# Patient Record
Sex: MALE | Race: BLACK OR AFRICAN AMERICAN | NOT HISPANIC OR LATINO | URBAN - METROPOLITAN AREA
[De-identification: names, ages, dates, MRNs, and addresses within clinical notes are randomized per-mention and may not be internally consistent; named-entity substitution may affect disease eponyms.]

---

## 2017-01-31 ENCOUNTER — TELEPHONE (OUTPATIENT)
Dept: INTERNAL MEDICINE | Facility: CLINIC | Age: 37
End: 2017-01-31

## 2017-01-31 RX ORDER — LEVOTHYROXINE SODIUM 300 UG/1
300 TABLET ORAL DAILY
Qty: 30 TABLET | Refills: 5 | Status: SHIPPED | OUTPATIENT
Start: 2017-01-31 | End: 2017-05-10 | Stop reason: SDUPTHER

## 2017-01-31 NOTE — TELEPHONE ENCOUNTER
rx sent to pharmacy. Spoke with Pt and informed him of this. Pt verbalized understanding and much appr'c

## 2017-01-31 NOTE — TELEPHONE ENCOUNTER
----- Message from Ashli Irwin sent at 1/31/2017  9:47 AM EST -----  Contact: DEMI ARCHER PH:[phone]  DEMI JOHNSON CALLING FOR A REFILL FOR LEVOTHYROXINE. HE USES THE "[facility] [facility]. HE CAN BE REACHED [phone]

## 2017-05-10 ENCOUNTER — OFFICE VISIT (OUTPATIENT)
Dept: INTERNAL MEDICINE | Facility: CLINIC | Age: 37
End: 2017-05-10

## 2017-05-10 VITALS
WEIGHT: 315 LBS | DIASTOLIC BLOOD PRESSURE: 76 MMHG | RESPIRATION RATE: 24 BRPM | BODY MASS INDEX: 37.19 KG/M2 | SYSTOLIC BLOOD PRESSURE: 110 MMHG | HEART RATE: 56 BPM | HEIGHT: 77 IN

## 2017-05-10 DIAGNOSIS — M54.2 NECK PAIN ON LEFT SIDE: ICD-10-CM

## 2017-05-10 DIAGNOSIS — Z00.129 ENCOUNTER FOR ROUTINE CHILD HEALTH EXAMINATION WITHOUT ABNORMAL FINDINGS: Primary | ICD-10-CM

## 2017-05-10 DIAGNOSIS — Z13.220 LIPID SCREENING: ICD-10-CM

## 2017-05-10 DIAGNOSIS — E03.9 HYPOTHYROIDISM, UNSPECIFIED TYPE: ICD-10-CM

## 2017-05-10 LAB
ALBUMIN SERPL-MCNC: 4.1 G/DL (ref 3.2–4.8)
ALBUMIN/GLOB SERPL: 1.6 G/DL (ref 1.5–2.5)
ALP SERPL-CCNC: 60 U/L (ref 25–100)
ALT SERPL W P-5'-P-CCNC: 18 U/L (ref 7–40)
ANION GAP SERPL CALCULATED.3IONS-SCNC: 7 MMOL/L (ref 3–11)
ARTICHOKE IGE QN: 72 MG/DL (ref 0–130)
AST SERPL-CCNC: 28 U/L (ref 0–33)
BILIRUB SERPL-MCNC: 0.8 MG/DL (ref 0.3–1.2)
BUN BLD-MCNC: 16 MG/DL (ref 9–23)
BUN/CREAT SERPL: 16 (ref 7–25)
CALCIUM SPEC-SCNC: 9.5 MG/DL (ref 8.7–10.4)
CHLORIDE SERPL-SCNC: 103 MMOL/L (ref 99–109)
CHOLEST SERPL-MCNC: 139 MG/DL (ref 0–200)
CO2 SERPL-SCNC: 29 MMOL/L (ref 20–31)
CREAT BLD-MCNC: 1 MG/DL (ref 0.6–1.3)
DEPRECATED RDW RBC AUTO: 44.6 FL (ref 37–54)
ERYTHROCYTE [DISTWIDTH] IN BLOOD BY AUTOMATED COUNT: 13.4 % (ref 11.3–14.5)
GFR SERPL CREATININE-BSD FRML MDRD: 102 ML/MIN/1.73
GLOBULIN UR ELPH-MCNC: 2.6 GM/DL
GLUCOSE BLD-MCNC: 97 MG/DL (ref 70–100)
HCT VFR BLD AUTO: 44 % (ref 38.9–50.9)
HDLC SERPL-MCNC: 46 MG/DL (ref 40–60)
HGB BLD-MCNC: 14.5 G/DL (ref 13.1–17.5)
MCH RBC QN AUTO: 29.8 PG (ref 27–31)
MCHC RBC AUTO-ENTMCNC: 33 G/DL (ref 32–36)
MCV RBC AUTO: 90.5 FL (ref 80–99)
PLATELET # BLD AUTO: 235 10*3/MM3 (ref 150–450)
PMV BLD AUTO: 9.7 FL (ref 6–12)
POTASSIUM BLD-SCNC: 4.2 MMOL/L (ref 3.5–5.5)
PROT SERPL-MCNC: 6.7 G/DL (ref 5.7–8.2)
RBC # BLD AUTO: 4.86 10*6/MM3 (ref 4.2–5.76)
SODIUM BLD-SCNC: 139 MMOL/L (ref 132–146)
T4 FREE SERPL-MCNC: 1.47 NG/DL (ref 0.89–1.76)
TRIGL SERPL-MCNC: 69 MG/DL (ref 0–150)
TSH SERPL DL<=0.05 MIU/L-ACNC: 4.54 MIU/ML (ref 0.35–5.35)
WBC NRBC COR # BLD: 4.36 10*3/MM3 (ref 3.5–10.8)

## 2017-05-10 PROCEDURE — 85027 COMPLETE CBC AUTOMATED: CPT | Performed by: INTERNAL MEDICINE

## 2017-05-10 PROCEDURE — 99385 PREV VISIT NEW AGE 18-39: CPT | Performed by: INTERNAL MEDICINE

## 2017-05-10 PROCEDURE — 90715 TDAP VACCINE 7 YRS/> IM: CPT | Performed by: INTERNAL MEDICINE

## 2017-05-10 PROCEDURE — 90471 IMMUNIZATION ADMIN: CPT | Performed by: INTERNAL MEDICINE

## 2017-05-10 PROCEDURE — 80053 COMPREHEN METABOLIC PANEL: CPT | Performed by: INTERNAL MEDICINE

## 2017-05-10 PROCEDURE — 80061 LIPID PANEL: CPT | Performed by: INTERNAL MEDICINE

## 2017-05-10 PROCEDURE — 84443 ASSAY THYROID STIM HORMONE: CPT | Performed by: INTERNAL MEDICINE

## 2017-05-10 PROCEDURE — 84439 ASSAY OF FREE THYROXINE: CPT | Performed by: INTERNAL MEDICINE

## 2017-05-10 PROCEDURE — 36415 COLL VENOUS BLD VENIPUNCTURE: CPT | Performed by: INTERNAL MEDICINE

## 2017-05-10 RX ORDER — LEVOTHYROXINE SODIUM 300 UG/1
300 TABLET ORAL DAILY
Qty: 90 TABLET | Refills: 2 | Status: SHIPPED | OUTPATIENT
Start: 2017-05-10 | End: 2018-08-04 | Stop reason: SDUPTHER

## 2017-06-28 ENCOUNTER — TELEPHONE (OUTPATIENT)
Dept: INTERNAL MEDICINE | Facility: CLINIC | Age: 37
End: 2017-06-28

## 2017-06-28 NOTE — TELEPHONE ENCOUNTER
----- Message from Rosa Vincent sent at 6/28/2017 11:06 AM EDT -----  [facility] in [place] wants to know if Dr. Rodriguez will prescribe saliva max oral rinse for dry mouth.  Call them at [phone]  .

## 2018-05-01 PROBLEM — M54.31 SCIATICA OF RIGHT SIDE: Status: ACTIVE | Noted: 2018-05-01

## 2018-05-02 ENCOUNTER — TELEPHONE (OUTPATIENT)
Dept: INTERNAL MEDICINE | Facility: CLINIC | Age: 38
End: 2018-05-02

## 2018-05-02 NOTE — TELEPHONE ENCOUNTER
In order for patient to have an MRI considered for ordering for insurance purposes patient will have to be seen and evaluated.

## 2018-05-02 NOTE — TELEPHONE ENCOUNTER
----- Message from Adrianna Corbin sent at 5/2/2018  8:02 AM EDT -----  PATIENT WENT TO URGENT CARE LAST NIGHT DUE TO BACK PAIN & HIP AREA. THEY GAVE THE PATIENT 3 SHOTS AND ARE REQUESTING HE GET AN MRI BUT IT WOULD NEED TO BE ORDERED.     PLEASE CALL PATIENT AT : [phone]    THANK YOU

## 2018-08-06 RX ORDER — LEVOTHYROXINE SODIUM 300 UG/1
TABLET ORAL
Qty: 30 TABLET | Refills: 0 | Status: SHIPPED | OUTPATIENT
Start: 2018-08-06 | End: 2019-02-16 | Stop reason: SDUPTHER

## 2019-01-09 RX ORDER — LEVOTHYROXINE SODIUM 300 UG/1
TABLET ORAL
Qty: 30 TABLET | Refills: 0 | OUTPATIENT
Start: 2019-01-09

## 2019-02-18 RX ORDER — LEVOTHYROXINE SODIUM 300 UG/1
TABLET ORAL
Qty: 30 TABLET | Refills: 0 | Status: SHIPPED | OUTPATIENT
Start: 2019-02-18 | End: 2019-04-12 | Stop reason: SDUPTHER

## 2019-03-18 RX ORDER — LEVOTHYROXINE SODIUM 300 UG/1
TABLET ORAL
Qty: 30 TABLET | Refills: 0 | OUTPATIENT
Start: 2019-03-18

## 2019-04-09 RX ORDER — LEVOTHYROXINE SODIUM 300 UG/1
TABLET ORAL
Qty: 30 TABLET | Refills: 0 | OUTPATIENT
Start: 2019-04-09

## 2019-04-12 ENCOUNTER — OFFICE VISIT (OUTPATIENT)
Dept: INTERNAL MEDICINE | Facility: CLINIC | Age: 39
End: 2019-04-12

## 2019-04-12 VITALS
HEART RATE: 56 BPM | HEIGHT: 77 IN | SYSTOLIC BLOOD PRESSURE: 116 MMHG | TEMPERATURE: 98 F | DIASTOLIC BLOOD PRESSURE: 76 MMHG | WEIGHT: 300 LBS | OXYGEN SATURATION: 96 % | BODY MASS INDEX: 35.42 KG/M2 | RESPIRATION RATE: 16 BRPM

## 2019-04-12 DIAGNOSIS — E03.9 ADULT HYPOTHYROIDISM: Primary | ICD-10-CM

## 2019-04-12 LAB
T4 FREE SERPL-MCNC: 0.61 NG/DL (ref 0.93–1.7)
TSH SERPL DL<=0.05 MIU/L-ACNC: 36.4 MIU/ML (ref 0.27–4.2)

## 2019-04-12 PROCEDURE — 99213 OFFICE O/P EST LOW 20 MIN: CPT | Performed by: PHYSICIAN ASSISTANT

## 2019-04-12 PROCEDURE — 36415 COLL VENOUS BLD VENIPUNCTURE: CPT | Performed by: PHYSICIAN ASSISTANT

## 2019-04-12 PROCEDURE — 84439 ASSAY OF FREE THYROXINE: CPT | Performed by: PHYSICIAN ASSISTANT

## 2019-04-12 PROCEDURE — 84443 ASSAY THYROID STIM HORMONE: CPT | Performed by: PHYSICIAN ASSISTANT

## 2019-04-12 RX ORDER — LEVOTHYROXINE SODIUM 300 UG/1
300 TABLET ORAL DAILY
Qty: 30 TABLET | Refills: 5 | Status: SHIPPED | OUTPATIENT
Start: 2019-04-12 | End: 2019-05-31

## 2019-04-12 NOTE — PROGRESS NOTES
Chief Complaint   Patient presents with   • Hypothyroidism     1 year F/U, refill medication       Subjective       History of Present Illness     Guido Tang is a 38 y.o. male. He presents for follow up of hypothyroidism. Pt states he is doing well with no significant new concerns. He has been out of his synthroid for about 10 days, prior to this was taking consistently every morning. He has noted a little more fatigue this week since being without his synthroid. Otherwise no problems. Denies HA, vision change, heart palpitations, CP, SOA, heat or cold intolerance, or any other concerns.         The following portions of the patient's history were reviewed and updated as appropriate: allergies, current medications, past medical history, past social history, past surgical history and problem list.    Allergies   Allergen Reactions   • Azithromycin Nausea Only and Nausea And Vomiting     Social History     Tobacco Use   • Smoking status: Never Smoker   Substance Use Topics   • Alcohol use: Yes     Comment: Social         Current Outpatient Medications:   •  levothyroxine (SYNTHROID, LEVOTHROID) 300 MCG tablet, Take 1 tablet by mouth Daily., Disp: 30 tablet, Rfl: 5  •  DUEXIS 800-26.6 MG tablet, , Disp: , Rfl:     Review of Systems   Constitutional: Positive for fatigue (mild low energy). Negative for chills and fever.   HENT: Negative for congestion and sore throat.    Eyes: Negative for blurred vision.   Respiratory: Negative for cough and shortness of breath.    Cardiovascular: Negative for chest pain and palpitations.   Gastrointestinal: Negative for abdominal pain, diarrhea, nausea and vomiting.   Endocrine: Negative for cold intolerance and heat intolerance.   Genitourinary: Negative for dysuria and hematuria.   Neurological: Negative for dizziness and headache.       Objective   Vitals:    04/12/19 1128   BP: 116/76   Pulse: 56   Resp: 16   Temp: 98 °F (36.7 °C)   SpO2: 96%     Physical Exam    Constitutional: He appears well-developed and well-nourished.   HENT:   Head: Normocephalic and atraumatic.   Right Ear: Tympanic membrane, external ear and ear canal normal.   Left Ear: Tympanic membrane, external ear and ear canal normal.   Mouth/Throat: Oropharynx is clear and moist and mucous membranes are normal.   Eyes: Conjunctivae are normal.   Neck: Normal range of motion. Neck supple. No thyromegaly present.   Cardiovascular: Normal rate and regular rhythm.   No murmur heard.  Pulmonary/Chest: Effort normal and breath sounds normal. He has no wheezes. He has no rales.   Lymphadenopathy:     He has no cervical adenopathy.   Psychiatric: He has a normal mood and affect. His behavior is normal.             Assessment/Plan   Guido was seen today for hypothyroidism.    Diagnoses and all orders for this visit:    Adult hypothyroidism  -     TSH  -     T4, Free    Other orders  -     levothyroxine (SYNTHROID, LEVOTHROID) 300 MCG tablet; Take 1 tablet by mouth Daily.      Resume synthroid as directed.          Return in about 6 months (around 10/12/2019) for Annual physical- fasting labs.

## 2019-04-12 NOTE — TELEPHONE ENCOUNTER
DEMI CHECKING STATUS OF REFILL REQUEST. HE HAS BEEN OUT FOR A FEW DAYS AND WOULD LIKE TO GET THIS TODAY IF POSSIBLE. HE CAN BE REACHED -[phone]

## 2019-04-15 ENCOUNTER — TELEPHONE (OUTPATIENT)
Dept: INTERNAL MEDICINE | Facility: CLINIC | Age: 39
End: 2019-04-15

## 2019-04-15 DIAGNOSIS — E03.9 HYPOTHYROIDISM, UNSPECIFIED TYPE: Primary | ICD-10-CM

## 2019-04-15 NOTE — TELEPHONE ENCOUNTER
----- Message from Mary Benson PA-C sent at 4/15/2019  1:19 PM EDT -----  Please inform pt that his thyroid levels are low, as expected as he has not been on synthroid x10 days. He needs to come back in office for labs only at his convenience in about 4 weeks (after resuming med), no sooner than 5/12/2019. Orders entered.

## 2019-05-29 ENCOUNTER — LAB (OUTPATIENT)
Dept: INTERNAL MEDICINE | Facility: CLINIC | Age: 39
End: 2019-05-29

## 2019-05-29 DIAGNOSIS — E03.9 HYPOTHYROIDISM, UNSPECIFIED TYPE: ICD-10-CM

## 2019-05-29 LAB
T4 FREE SERPL-MCNC: 1.74 NG/DL (ref 0.93–1.7)
TSH SERPL DL<=0.05 MIU/L-ACNC: 0.18 MIU/ML (ref 0.27–4.2)

## 2019-05-29 PROCEDURE — 84443 ASSAY THYROID STIM HORMONE: CPT | Performed by: PHYSICIAN ASSISTANT

## 2019-05-29 PROCEDURE — 36415 COLL VENOUS BLD VENIPUNCTURE: CPT | Performed by: PHYSICIAN ASSISTANT

## 2019-05-29 PROCEDURE — 84439 ASSAY OF FREE THYROXINE: CPT | Performed by: PHYSICIAN ASSISTANT

## 2019-05-31 DIAGNOSIS — E03.9 HYPOTHYROIDISM, UNSPECIFIED TYPE: Primary | ICD-10-CM

## 2019-05-31 RX ORDER — LEVOTHYROXINE SODIUM 0.2 MG/1
200 TABLET ORAL DAILY
Qty: 30 TABLET | Refills: 2 | Status: SHIPPED | OUTPATIENT
Start: 2019-05-31 | End: 2019-11-28 | Stop reason: SDUPTHER

## 2019-10-14 ENCOUNTER — OFFICE VISIT (OUTPATIENT)
Dept: INTERNAL MEDICINE | Facility: CLINIC | Age: 39
End: 2019-10-14

## 2019-10-14 VITALS
HEART RATE: 52 BPM | RESPIRATION RATE: 20 BRPM | HEIGHT: 77 IN | BODY MASS INDEX: 35 KG/M2 | SYSTOLIC BLOOD PRESSURE: 124 MMHG | WEIGHT: 296.38 LBS | DIASTOLIC BLOOD PRESSURE: 80 MMHG | TEMPERATURE: 97.3 F | OXYGEN SATURATION: 99 %

## 2019-10-14 DIAGNOSIS — E03.9 HYPOTHYROIDISM, UNSPECIFIED TYPE: ICD-10-CM

## 2019-10-14 DIAGNOSIS — D22.9 NEVUS: ICD-10-CM

## 2019-10-14 DIAGNOSIS — Z12.5 SCREENING PSA (PROSTATE SPECIFIC ANTIGEN): ICD-10-CM

## 2019-10-14 DIAGNOSIS — Z00.00 WELL ADULT EXAM: ICD-10-CM

## 2019-10-14 DIAGNOSIS — R07.89 SENSATION OF CHEST PRESSURE: Primary | ICD-10-CM

## 2019-10-14 LAB
ALBUMIN SERPL-MCNC: 4.4 G/DL (ref 3.5–5.2)
ALBUMIN/GLOB SERPL: 1.5 G/DL
ALP SERPL-CCNC: 53 U/L (ref 39–117)
ALT SERPL W P-5'-P-CCNC: 10 U/L (ref 1–41)
ANION GAP SERPL CALCULATED.3IONS-SCNC: 9.7 MMOL/L (ref 5–15)
AST SERPL-CCNC: 18 U/L (ref 1–40)
BILIRUB SERPL-MCNC: 0.7 MG/DL (ref 0.2–1.2)
BUN BLD-MCNC: 17 MG/DL (ref 6–20)
BUN/CREAT SERPL: 12.5 (ref 7–25)
CALCIUM SPEC-SCNC: 9.1 MG/DL (ref 8.6–10.5)
CHLORIDE SERPL-SCNC: 102 MMOL/L (ref 98–107)
CHOLEST SERPL-MCNC: 142 MG/DL (ref 0–200)
CO2 SERPL-SCNC: 29.3 MMOL/L (ref 22–29)
CREAT BLD-MCNC: 1.36 MG/DL (ref 0.76–1.27)
DEPRECATED RDW RBC AUTO: 43.3 FL (ref 37–54)
ERYTHROCYTE [DISTWIDTH] IN BLOOD BY AUTOMATED COUNT: 12.8 % (ref 12.3–15.4)
GFR SERPL CREATININE-BSD FRML MDRD: 71 ML/MIN/1.73
GLOBULIN UR ELPH-MCNC: 2.9 GM/DL
GLUCOSE BLD-MCNC: 87 MG/DL (ref 65–99)
HCT VFR BLD AUTO: 46.4 % (ref 37.5–51)
HDLC SERPL-MCNC: 52 MG/DL (ref 40–60)
HGB BLD-MCNC: 15.6 G/DL (ref 13–17.7)
LDLC SERPL CALC-MCNC: 77 MG/DL (ref 0–100)
LDLC/HDLC SERPL: 1.48 {RATIO}
MCH RBC QN AUTO: 30.7 PG (ref 26.6–33)
MCHC RBC AUTO-ENTMCNC: 33.6 G/DL (ref 31.5–35.7)
MCV RBC AUTO: 91.3 FL (ref 79–97)
PLATELET # BLD AUTO: 253 10*3/MM3 (ref 140–450)
PMV BLD AUTO: 9.4 FL (ref 6–12)
POTASSIUM BLD-SCNC: 4.5 MMOL/L (ref 3.5–5.2)
PROT SERPL-MCNC: 7.3 G/DL (ref 6–8.5)
PSA SERPL-MCNC: 0.68 NG/ML (ref 0–4)
RBC # BLD AUTO: 5.08 10*6/MM3 (ref 4.14–5.8)
SODIUM BLD-SCNC: 141 MMOL/L (ref 136–145)
T4 FREE SERPL-MCNC: 1.77 NG/DL (ref 0.93–1.7)
TRIGL SERPL-MCNC: 64 MG/DL (ref 0–150)
TSH SERPL DL<=0.05 MIU/L-ACNC: 5.34 UIU/ML (ref 0.27–4.2)
VLDLC SERPL-MCNC: 12.8 MG/DL (ref 5–40)
WBC NRBC COR # BLD: 3.66 10*3/MM3 (ref 3.4–10.8)

## 2019-10-14 PROCEDURE — 84443 ASSAY THYROID STIM HORMONE: CPT | Performed by: INTERNAL MEDICINE

## 2019-10-14 PROCEDURE — 80053 COMPREHEN METABOLIC PANEL: CPT | Performed by: INTERNAL MEDICINE

## 2019-10-14 PROCEDURE — 84439 ASSAY OF FREE THYROXINE: CPT | Performed by: INTERNAL MEDICINE

## 2019-10-14 PROCEDURE — 80061 LIPID PANEL: CPT | Performed by: INTERNAL MEDICINE

## 2019-10-14 PROCEDURE — 85027 COMPLETE CBC AUTOMATED: CPT | Performed by: INTERNAL MEDICINE

## 2019-10-14 PROCEDURE — 99395 PREV VISIT EST AGE 18-39: CPT | Performed by: INTERNAL MEDICINE

## 2019-10-14 PROCEDURE — G0103 PSA SCREENING: HCPCS | Performed by: INTERNAL MEDICINE

## 2019-10-14 PROCEDURE — 36415 COLL VENOUS BLD VENIPUNCTURE: CPT | Performed by: INTERNAL MEDICINE

## 2019-10-14 PROCEDURE — 93000 ELECTROCARDIOGRAM COMPLETE: CPT | Performed by: INTERNAL MEDICINE

## 2019-10-14 NOTE — PROGRESS NOTES
Subjective   Guido Tang is a 38 y.o. male.     History of Present Illness     The following portions of the patient's history were reviewed and updated as appropriate: allergies, current medications, past family history, past medical history, past social history, past surgical history and problem list.        Complete Adult Physical    1 hypothyroid- chronic and doing well.  Continue on the levothyroxine.    2 chest pressure-patient says that he has been having intermittent episodes of chest pressure over the past 1 year.  These sensations usually occur when he is under a lot of stress.  Patient says that these sensations only lasts a few seconds to minutes and then they subside.  He does other physical activity and the symptoms never occur but usually it occurs during high stress.  Patient denies any fever, chills, nausea, vomiting, headache, dyspnea on exertion, palpitations, dizziness, syncope, or any other systemic signs.    Diet regular        Exercise: Occasional        Social History: No EtOH consumption, no IV drug use, no marijuana, illicit drugs.        Preventative Screenings  Discussed PSA screening        Immunizations: Patient declined influenza vaccine.            Review of Systems   Constitutional: Negative.    HENT: Negative.    Eyes: Negative.    Respiratory: Negative.    Cardiovascular: Negative.    Gastrointestinal: Negative.    Endocrine: Negative.    Genitourinary: Negative.    Musculoskeletal: Negative.    All other systems reviewed and are negative.      Objective   Physical Exam   Constitutional: He is oriented to person, place, and time. He appears well-developed and well-nourished.   HENT:   Head: Normocephalic.   Right Ear: External ear normal.   Left Ear: External ear normal.   Nose: Nose normal.   Mouth/Throat: Oropharynx is clear and moist.   Eyes: Conjunctivae and EOM are normal. Pupils are equal, round, and reactive to light.   Neck: Normal range of motion. Neck supple.    Cardiovascular: Normal rate, regular rhythm, normal heart sounds and intact distal pulses.   Pulmonary/Chest: Effort normal and breath sounds normal.   Abdominal: Soft. Bowel sounds are normal.   Genitourinary: Rectum normal, prostate normal and penis normal.   Musculoskeletal: Normal range of motion.   Neurological: He is alert and oriented to person, place, and time.   Skin: Skin is warm.   Psychiatric: He has a normal mood and affect. His behavior is normal. Judgment and thought content normal.   Nursing note and vitals reviewed.        Assessment/Plan   Guido was seen today for annual exam.    Diagnoses and all orders for this visit:    Sensation of chest pressure  -     ECG 12 Lead  -     Treadmill Stress Test; Future    Hypothyroidism, unspecified type  -     TSH  -     T4, Free    Screening PSA (prostate specific antigen)  -     PSA Screen    Well adult exam  -     CBC (No Diff)  -     Comprehensive Metabolic Panel  -     Lipid Panel    Nevus  -     Ambulatory Referral to Dermatology    Health maint:  Recommend routine dental visit  Recommend routine vision screening          ECG 12 Lead  Date/Time: 10/14/2019 9:37 AM  Performed by: Davidson Rodriguez MD  Authorized by: Davidson Rodriguez MD   Comparison: not compared with previous ECG   Rhythm: sinus bradycardia  Rate: normal  Conduction: conduction normal  ST Segments: ST segments normal  T Waves: T waves normal  QRS axis: normal  Other: no other findings    Clinical impression: normal ECG

## 2019-11-28 DIAGNOSIS — E03.9 HYPOTHYROIDISM, UNSPECIFIED TYPE: ICD-10-CM

## 2019-12-02 RX ORDER — LEVOTHYROXINE SODIUM 0.2 MG/1
TABLET ORAL
Qty: 30 TABLET | Refills: 1 | Status: SHIPPED | OUTPATIENT
Start: 2019-12-02 | End: 2020-04-09 | Stop reason: SDUPTHER

## 2020-02-19 ENCOUNTER — OFFICE VISIT (OUTPATIENT)
Dept: INTERNAL MEDICINE | Facility: CLINIC | Age: 40
End: 2020-02-19

## 2020-02-19 VITALS
OXYGEN SATURATION: 99 % | BODY MASS INDEX: 36.52 KG/M2 | WEIGHT: 308 LBS | TEMPERATURE: 98.1 F | HEART RATE: 58 BPM | DIASTOLIC BLOOD PRESSURE: 78 MMHG | SYSTOLIC BLOOD PRESSURE: 124 MMHG | RESPIRATION RATE: 20 BRPM

## 2020-02-19 DIAGNOSIS — J06.9 VIRAL URI WITH COUGH: Primary | ICD-10-CM

## 2020-02-19 PROCEDURE — 99213 OFFICE O/P EST LOW 20 MIN: CPT | Performed by: INTERNAL MEDICINE

## 2020-02-19 RX ORDER — AMOXICILLIN 875 MG/1
875 TABLET, COATED ORAL 2 TIMES DAILY
Qty: 16 TABLET | Refills: 0 | Status: SHIPPED | OUTPATIENT
Start: 2020-02-19 | End: 2020-12-06

## 2020-02-19 NOTE — PROGRESS NOTES
Subjective   Guido Tang is a 39 y.o. male.     History of Present Illness     The following portions of the patient's history were reviewed and updated as appropriate: allergies, current medications, past family history, past medical history, past social history, past surgical history and problem list.    Cough, congestion, runny nose  Duration  2 weeks  Sx Patient was initially having symptoms approximately 1 week ago with extreme fatigue, high fever, congestion, nausea    Review of Systems   All other systems reviewed and are negative.      Objective   Physical Exam   Constitutional: He is oriented to person, place, and time. He appears well-developed and well-nourished.   HENT:   Head: Normocephalic.   Right Ear: External ear normal.   Left Ear: External ear normal.   Nose: Nose normal.   Mouth/Throat: Oropharynx is clear and moist.   Eyes: Pupils are equal, round, and reactive to light. Conjunctivae and EOM are normal.   Neck: Normal range of motion. Neck supple.   Cardiovascular: Normal rate, regular rhythm and normal heart sounds.   Pulmonary/Chest: Effort normal and breath sounds normal.   Abdominal: Soft. Bowel sounds are normal.   Musculoskeletal: Normal range of motion.   Neurological: He is alert and oriented to person, place, and time.   Skin: Skin is warm.   Nursing note and vitals reviewed.        Assessment/Plan   Guido was seen today for nasal congestion and sore throat.    Diagnoses and all orders for this visit:    Viral URI with cough  -     amoxicillin (AMOXIL) 875 MG tablet; Take 1 tablet by mouth 2 (Two) Times a Day.    Recommend over-the-counter decongestants like Sudafed.  Delsym or Mucinex for cough.

## 2020-04-09 DIAGNOSIS — E03.9 HYPOTHYROIDISM, UNSPECIFIED TYPE: ICD-10-CM

## 2020-04-09 RX ORDER — LEVOTHYROXINE SODIUM 0.2 MG/1
200 TABLET ORAL DAILY
Qty: 30 TABLET | Refills: 1 | Status: SHIPPED | OUTPATIENT
Start: 2020-04-09 | End: 2020-06-22

## 2020-04-09 NOTE — TELEPHONE ENCOUNTER
Pt called and requested a refill fro his levothyroxine (SYNTHROID, LEVOTHROID) 200 MCG tablet.    Please send to [facility] in [place] on [facility]

## 2020-06-22 DIAGNOSIS — E03.9 HYPOTHYROIDISM, UNSPECIFIED TYPE: ICD-10-CM

## 2020-06-22 RX ORDER — LEVOTHYROXINE SODIUM 0.2 MG/1
TABLET ORAL
Qty: 30 TABLET | Refills: 0 | Status: SHIPPED | OUTPATIENT
Start: 2020-06-22 | End: 2020-07-14

## 2020-07-14 DIAGNOSIS — E03.9 HYPOTHYROIDISM, UNSPECIFIED TYPE: ICD-10-CM

## 2020-07-14 RX ORDER — LEVOTHYROXINE SODIUM 0.2 MG/1
TABLET ORAL
Qty: 30 TABLET | Refills: 0 | Status: SHIPPED | OUTPATIENT
Start: 2020-07-14 | End: 2020-09-08

## 2020-09-08 DIAGNOSIS — E03.9 HYPOTHYROIDISM, UNSPECIFIED TYPE: ICD-10-CM

## 2020-09-08 RX ORDER — LEVOTHYROXINE SODIUM 0.2 MG/1
TABLET ORAL
Qty: 30 TABLET | Refills: 0 | Status: SHIPPED | OUTPATIENT
Start: 2020-09-08 | End: 2020-10-22

## 2020-10-20 DIAGNOSIS — E03.9 HYPOTHYROIDISM, UNSPECIFIED TYPE: ICD-10-CM

## 2020-10-22 RX ORDER — LEVOTHYROXINE SODIUM 0.2 MG/1
TABLET ORAL
Qty: 90 TABLET | Refills: 3 | Status: SHIPPED | OUTPATIENT
Start: 2020-10-22 | End: 2022-01-21

## 2020-12-03 ENCOUNTER — OFFICE VISIT (OUTPATIENT)
Dept: INTERNAL MEDICINE | Facility: CLINIC | Age: 40
End: 2020-12-03

## 2020-12-03 VITALS
HEIGHT: 78 IN | RESPIRATION RATE: 21 BRPM | WEIGHT: 313 LBS | TEMPERATURE: 98 F | DIASTOLIC BLOOD PRESSURE: 80 MMHG | BODY MASS INDEX: 36.21 KG/M2 | SYSTOLIC BLOOD PRESSURE: 126 MMHG | HEART RATE: 78 BPM

## 2020-12-03 DIAGNOSIS — N52.9 ERECTILE DYSFUNCTION, UNSPECIFIED ERECTILE DYSFUNCTION TYPE: ICD-10-CM

## 2020-12-03 DIAGNOSIS — E03.9 HYPOTHYROIDISM, UNSPECIFIED TYPE: ICD-10-CM

## 2020-12-03 DIAGNOSIS — Z00.00 WELL ADULT EXAM: Primary | ICD-10-CM

## 2020-12-03 DIAGNOSIS — Z12.5 SCREENING PSA (PROSTATE SPECIFIC ANTIGEN): ICD-10-CM

## 2020-12-03 LAB
ALBUMIN SERPL-MCNC: 4 G/DL (ref 3.5–5.2)
ALBUMIN/GLOB SERPL: 1.3 G/DL
ALP SERPL-CCNC: 57 U/L (ref 39–117)
ALT SERPL W P-5'-P-CCNC: 13 U/L (ref 1–41)
ANION GAP SERPL CALCULATED.3IONS-SCNC: 8.5 MMOL/L (ref 5–15)
AST SERPL-CCNC: 20 U/L (ref 1–40)
BILIRUB SERPL-MCNC: 0.6 MG/DL (ref 0–1.2)
BUN SERPL-MCNC: 16 MG/DL (ref 6–20)
BUN/CREAT SERPL: 15 (ref 7–25)
CALCIUM SPEC-SCNC: 9.6 MG/DL (ref 8.6–10.5)
CHLORIDE SERPL-SCNC: 102 MMOL/L (ref 98–107)
CHOLEST SERPL-MCNC: 146 MG/DL (ref 0–200)
CO2 SERPL-SCNC: 28.5 MMOL/L (ref 22–29)
CREAT SERPL-MCNC: 1.07 MG/DL (ref 0.76–1.27)
DEPRECATED RDW RBC AUTO: 41.8 FL (ref 37–54)
ERYTHROCYTE [DISTWIDTH] IN BLOOD BY AUTOMATED COUNT: 12.7 % (ref 12.3–15.4)
GFR SERPL CREATININE-BSD FRML MDRD: 93 ML/MIN/1.73
GLOBULIN UR ELPH-MCNC: 3.1 GM/DL
GLUCOSE SERPL-MCNC: 85 MG/DL (ref 65–99)
HCT VFR BLD AUTO: 48.5 % (ref 37.5–51)
HDLC SERPL-MCNC: 50 MG/DL (ref 40–60)
HGB BLD-MCNC: 16 G/DL (ref 13–17.7)
LDLC SERPL CALC-MCNC: 72 MG/DL (ref 0–100)
LDLC/HDLC SERPL: 1.37 {RATIO}
MCH RBC QN AUTO: 29.9 PG (ref 26.6–33)
MCHC RBC AUTO-ENTMCNC: 33 G/DL (ref 31.5–35.7)
MCV RBC AUTO: 90.7 FL (ref 79–97)
PLATELET # BLD AUTO: 279 10*3/MM3 (ref 140–450)
PMV BLD AUTO: 9.3 FL (ref 6–12)
POTASSIUM SERPL-SCNC: 4.3 MMOL/L (ref 3.5–5.2)
PROT SERPL-MCNC: 7.1 G/DL (ref 6–8.5)
PSA SERPL-MCNC: 0.68 NG/ML (ref 0–4)
RBC # BLD AUTO: 5.35 10*6/MM3 (ref 4.14–5.8)
SODIUM SERPL-SCNC: 139 MMOL/L (ref 136–145)
T4 FREE SERPL-MCNC: 1.21 NG/DL (ref 0.93–1.7)
TRIGL SERPL-MCNC: 138 MG/DL (ref 0–150)
TSH SERPL DL<=0.05 MIU/L-ACNC: 5.62 UIU/ML (ref 0.27–4.2)
VLDLC SERPL-MCNC: 24 MG/DL (ref 5–40)
WBC # BLD AUTO: 4.29 10*3/MM3 (ref 3.4–10.8)

## 2020-12-03 PROCEDURE — 36415 COLL VENOUS BLD VENIPUNCTURE: CPT | Performed by: INTERNAL MEDICINE

## 2020-12-03 PROCEDURE — 99396 PREV VISIT EST AGE 40-64: CPT | Performed by: INTERNAL MEDICINE

## 2020-12-03 PROCEDURE — G0103 PSA SCREENING: HCPCS | Performed by: INTERNAL MEDICINE

## 2020-12-03 PROCEDURE — 84439 ASSAY OF FREE THYROXINE: CPT | Performed by: INTERNAL MEDICINE

## 2020-12-03 PROCEDURE — 85027 COMPLETE CBC AUTOMATED: CPT | Performed by: INTERNAL MEDICINE

## 2020-12-03 PROCEDURE — 80053 COMPREHEN METABOLIC PANEL: CPT | Performed by: INTERNAL MEDICINE

## 2020-12-03 PROCEDURE — 80061 LIPID PANEL: CPT | Performed by: INTERNAL MEDICINE

## 2020-12-03 PROCEDURE — 84443 ASSAY THYROID STIM HORMONE: CPT | Performed by: INTERNAL MEDICINE

## 2020-12-03 RX ORDER — VARDENAFIL HYDROCHLORIDE 10 MG/1
10 TABLET ORAL DAILY PRN
Qty: 3 TABLET | Refills: 2 | Status: SHIPPED | OUTPATIENT
Start: 2020-12-03 | End: 2021-02-17 | Stop reason: SDUPTHER

## 2020-12-03 NOTE — PROGRESS NOTES
Subjective   Guido Tang is a 39 y.o. male.     History of Present Illness     The following portions of the patient's history were reviewed and updated as appropriate: allergies, current medications, past family history, past medical history, past social history, past surgical history and problem list.      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  Patient has been having difficulty with concentration, emotional concerns at times, but nothing serious such as worsening depression, suicidal ideations, or hallucinations.    2. Vasectomy referral-patient would like a referral for vasectomy    3 erectile dysfunction   Duration 3-4  Patient says that he has been having difficulty with maintaining an erection.  He has noticed that after penetrating his spouse he may lose the erection in the middle of sexual act and this has become more frequent.  No dysuria, no urgency, no frequency, difficulty urinating.      Diet regular        Exercise: Minimal to none        Social History: No EtOH consumption, no IV drug use, no marijuana, no illicit drugs.        Preventative Screenings: Up-to-date        Immunizations: Up-to-date        Review of Systems   All other systems reviewed and are negative.      Objective   Physical Exam  Vitals signs and nursing note reviewed.   Constitutional:       Appearance: Normal appearance. He is normal weight.   HENT:      Head: Normocephalic and atraumatic.      Right Ear: Tympanic membrane, ear canal and external ear normal.      Left Ear: Tympanic membrane, ear canal and external ear normal.      Nose: Nose normal.      Mouth/Throat:      Mouth: Mucous membranes are moist.   Eyes:      Extraocular Movements: Extraocular movements intact.      Conjunctiva/sclera: Conjunctivae normal.      Pupils: Pupils are equal, round, and reactive to  light.   Neck:      Musculoskeletal: Normal range of motion and neck supple.   Cardiovascular:      Rate and Rhythm: Normal rate.      Pulses: Normal pulses.      Heart sounds: Normal heart sounds.   Pulmonary:      Effort: Pulmonary effort is normal.      Breath sounds: Normal breath sounds.   Abdominal:      General: Bowel sounds are normal.      Palpations: Abdomen is soft.   Musculoskeletal: Normal range of motion.   Skin:     General: Skin is warm.      Capillary Refill: Capillary refill takes less than 2 seconds.   Neurological:      General: No focal deficit present.      Mental Status: He is alert.   Psychiatric:         Mood and Affect: Mood normal.         Behavior: Behavior normal.         Thought Content: Thought content normal.         Judgment: Judgment normal.           Assessment/Plan   Diagnoses and all orders for this visit:    1. Well adult exam (Primary)  -     CBC (No Diff)  -     Comprehensive Metabolic Panel  -     TSH  -     T4, Free  -     Lipid Panel    2. Screening PSA (prostate specific antigen)  -     PSA Screen    3. Hypothyroidism, unspecified type    4. Erectile dysfunction, unspecified erectile dysfunction type  -     vardenafil (Levitra) 10 MG tablet; Take 1 tablet by mouth Daily As Needed for Erectile Dysfunction.  Dispense: 3 tablet; Refill: 2  Side effects and precautions of the new medication(s) have been discussed with patient  I have answered patients questions thoroughly to their understanding.  If patient should experience any side effects from the medication, a follow up appointment should be made.      Anticipatory guidance:  Recommend routine dental visit.

## 2021-02-03 ENCOUNTER — OFFICE VISIT (OUTPATIENT)
Dept: INTERNAL MEDICINE | Facility: CLINIC | Age: 41
End: 2021-02-03

## 2021-02-03 VITALS
BODY MASS INDEX: 36.45 KG/M2 | RESPIRATION RATE: 20 BRPM | HEIGHT: 78 IN | SYSTOLIC BLOOD PRESSURE: 122 MMHG | HEART RATE: 61 BPM | TEMPERATURE: 98.9 F | WEIGHT: 315 LBS | DIASTOLIC BLOOD PRESSURE: 80 MMHG | OXYGEN SATURATION: 98 %

## 2021-02-03 DIAGNOSIS — N52.9 ERECTILE DYSFUNCTION, UNSPECIFIED ERECTILE DYSFUNCTION TYPE: Primary | ICD-10-CM

## 2021-02-03 DIAGNOSIS — E03.9 HYPOTHYROIDISM, UNSPECIFIED TYPE: ICD-10-CM

## 2021-02-03 PROCEDURE — 99213 OFFICE O/P EST LOW 20 MIN: CPT | Performed by: INTERNAL MEDICINE

## 2021-02-03 NOTE — PROGRESS NOTES
Subjective   Guido Tang is a 40 y.o. male.     History of Present Illness     The following portions of the patient's history were reviewed and updated as appropriate: allergies, current medications, past family history, past medical history, past social history, past surgical history and problem list.    1 erectile dysfunction-patient is here for follow-up with a trial of Levitra and he says that it did work very well for him.  Patient had no side effects from the medication and would like to keep taking this for his condition.    Review of Systems   All other systems reviewed and are negative.      Objective   Physical Exam  Constitutional:       Appearance: Normal appearance. He is normal weight.   HENT:      Head: Normocephalic.      Nose: Nose normal.      Mouth/Throat:      Mouth: Mucous membranes are moist.   Eyes:      Extraocular Movements: Extraocular movements intact.      Pupils: Pupils are equal, round, and reactive to light.   Neck:      Musculoskeletal: Normal range of motion.   Cardiovascular:      Rate and Rhythm: Normal rate.      Pulses: Normal pulses.   Pulmonary:      Effort: Pulmonary effort is normal.      Breath sounds: Normal breath sounds.   Abdominal:      General: Bowel sounds are normal.      Palpations: Abdomen is soft.   Neurological:      Mental Status: He is alert.           Assessment/Plan   Diagnoses and all orders for this visit:    1. Erectile dysfunction, unspecified erectile dysfunction type (Primary)-continue on current Levitra    2. Hypothyroidism, unspecified type-continue on current levothyroxine dosage

## 2021-02-17 DIAGNOSIS — N52.9 ERECTILE DYSFUNCTION, UNSPECIFIED ERECTILE DYSFUNCTION TYPE: ICD-10-CM

## 2021-02-17 RX ORDER — VARDENAFIL HYDROCHLORIDE 10 MG/1
10 TABLET ORAL DAILY PRN
Qty: 5 TABLET | Refills: 5 | Status: SHIPPED | OUTPATIENT
Start: 2021-02-17 | End: 2021-06-15 | Stop reason: SDUPTHER

## 2021-02-17 NOTE — TELEPHONE ENCOUNTER
Last Office Visit: 02/03/2021  Next Office Visit: 08/04/2021    Labs completed in past 6 months? yes  Labs completed in past year? yes    Last Refill Date: 12/03/2020  Quantity: 3  Refills: 2    Pharmacy:   [facility] [address] [place] [address] [phone]  - [phone]    Phone:  [phone]   Fax:  [phone]

## 2021-02-17 NOTE — TELEPHONE ENCOUNTER
PT CALLED TO REQUEST REFILL FOR RX  vardenafil (Levitra) 10 MG tablet.    PLEASE ADVISE.  CALL BACK:[phone]      [address]

## 2021-06-15 DIAGNOSIS — N52.9 ERECTILE DYSFUNCTION, UNSPECIFIED ERECTILE DYSFUNCTION TYPE: ICD-10-CM

## 2021-06-15 NOTE — TELEPHONE ENCOUNTER
Caller: Guido Tang    Relationship: Self    Best call back number:[phone]    Medication needed:   Requested Prescriptions     Pending Prescriptions Disp Refills   • vardenafil (Levitra) 10 MG tablet 5 tablet 5     Sig: Take 1 tablet by mouth Daily As Needed for Erectile Dysfunction.       When do you need the refill by: ASAP    What additional details did the patient provide when requesting the medication: PATIENT STATED THAT HE HAS 1 DAY LEFT    Does the patient have less than a 3 day supply:  [x] Yes  [] No    What is the patient's preferred pharmacy: [facility] [address] [phone] [facility] [phone]

## 2021-06-16 RX ORDER — VARDENAFIL HYDROCHLORIDE 10 MG/1
10 TABLET ORAL DAILY PRN
Qty: 5 TABLET | Refills: 5 | Status: SHIPPED | OUTPATIENT
Start: 2021-06-16 | End: 2021-10-05 | Stop reason: SDUPTHER

## 2021-10-05 ENCOUNTER — OFFICE VISIT (OUTPATIENT)
Dept: INTERNAL MEDICINE | Facility: CLINIC | Age: 41
End: 2021-10-05

## 2021-10-05 VITALS
SYSTOLIC BLOOD PRESSURE: 140 MMHG | WEIGHT: 315 LBS | HEIGHT: 78 IN | HEART RATE: 64 BPM | DIASTOLIC BLOOD PRESSURE: 70 MMHG | OXYGEN SATURATION: 98 % | TEMPERATURE: 98.2 F | BODY MASS INDEX: 36.45 KG/M2 | RESPIRATION RATE: 20 BRPM

## 2021-10-05 DIAGNOSIS — E03.9 HYPOTHYROIDISM, UNSPECIFIED TYPE: Primary | ICD-10-CM

## 2021-10-05 DIAGNOSIS — N52.9 ERECTILE DYSFUNCTION, UNSPECIFIED ERECTILE DYSFUNCTION TYPE: ICD-10-CM

## 2021-10-05 DIAGNOSIS — M77.8 ELBOW TENDONITIS: ICD-10-CM

## 2021-10-05 PROCEDURE — 99214 OFFICE O/P EST MOD 30 MIN: CPT | Performed by: INTERNAL MEDICINE

## 2021-10-05 RX ORDER — VARDENAFIL HYDROCHLORIDE 10 MG/1
10 TABLET ORAL DAILY PRN
Qty: 5 TABLET | Refills: 5 | Status: SHIPPED | OUTPATIENT
Start: 2021-10-05 | End: 2022-04-21

## 2021-10-05 NOTE — PROGRESS NOTES
"Chief Complaint  Med Refill (6 month follow up)    Subjective    Guido Tang is a 40 y.o. male.     Guido Tang presents to [facility] for       History of Present Illness    The following portions of the patient's history were reviewed and updated as appropriate: allergies, current medications, past family history, past medical history, past social history, past surgical history and problem list.    1 hypothyroid-chronic and stable.  Patient has had no side effects on the medication and continues on the levothyroxine 20 mcg 1 tablet by mouth once a day    2 erectile dysfunction-doing well on Levitra and patient would like to continue his medication has had no side effects    3 elbow tendonitis-acute patient recently had mild issues with elbow playing some golf.  Patient says the pain is made worse with flexion, extension, rotational movement.  But it is improving.    Review of Systems   All other systems reviewed and are negative.      Objective   Vital Signs:   /70   Pulse 64   Temp 98.2 °F (36.8 °C) (Temporal)   Resp 20   Ht 198.1 cm (78\")   Wt (!) 165 kg (364 lb 4 oz)   SpO2 98%   BMI 42.09 kg/m²     Body mass index is 42.09 kg/m².    Physical Exam  Vitals and nursing note reviewed.   Constitutional:       Appearance: Normal appearance. He is normal weight.   HENT:      Head: Normocephalic and atraumatic.      Nose: Nose normal.      Mouth/Throat:      Mouth: Mucous membranes are moist.   Eyes:      Extraocular Movements: Extraocular movements intact.      Pupils: Pupils are equal, round, and reactive to light.   Cardiovascular:      Rate and Rhythm: Normal rate.   Musculoskeletal:         General: Tenderness present. Normal range of motion.      Cervical back: Normal range of motion and neck supple.      Comments: Tenderness at the right elbow   Neurological:      Mental Status: He is alert.               Assessment and Plan  Diagnoses and " all orders for this visit:        Diagnoses and all orders for this visit:    1. Hypothyroidism, unspecified type (Primary)-continue on current levothyroxine.    2. Erectile dysfunction, unspecified erectile dysfunction type  -     vardenafil (Levitra) 10 MG tablet; Take 1 tablet by mouth Daily As Needed for Erectile Dysfunction.  Dispense: 5 tablet; Refill: 5    3. Elbow tendonitis-continue on current NSAIDs, passive range of motion,

## 2022-01-20 DIAGNOSIS — E03.9 HYPOTHYROIDISM, UNSPECIFIED TYPE: ICD-10-CM

## 2022-01-21 RX ORDER — LEVOTHYROXINE SODIUM 0.2 MG/1
TABLET ORAL
Qty: 90 TABLET | Refills: 3 | Status: SHIPPED | OUTPATIENT
Start: 2022-01-21

## 2022-02-03 ENCOUNTER — TELEMEDICINE (OUTPATIENT)
Dept: INTERNAL MEDICINE | Facility: CLINIC | Age: 42
End: 2022-02-03

## 2022-02-03 DIAGNOSIS — S86.009A INJURY OF ACHILLES TENDON, UNSPECIFIED LATERALITY, INITIAL ENCOUNTER: Primary | ICD-10-CM

## 2022-02-03 PROCEDURE — 99212 OFFICE O/P EST SF 10 MIN: CPT | Performed by: INTERNAL MEDICINE

## 2022-02-03 NOTE — PROGRESS NOTES
Chief Complaint  No chief complaint on file.    Subjective    Guido Tang is a 41 y.o. male.     Guido Tang presents to [facility] for       History of Present Illness    The following portions of the patient's history were reviewed and updated as appropriate: allergies, current medications, past family history, past medical history, past social history, past surgical history and problem list.     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  Patient was taken to the emergency room and was diagnosed with a torn Achilles tendon.  Patient has since then underwent surgery      Review of Systems   All other systems reviewed and are negative.      Objective   Vital Signs:   There were no vitals taken for this visit.    There is no height or weight on file to calculate BMI.    Physical Exam  Vitals and nursing note reviewed.   Constitutional:       Appearance: Normal appearance.   HENT:      Head: Normocephalic and atraumatic.      Mouth/Throat:      Mouth: Mucous membranes are moist.   Eyes:      Extraocular Movements: Extraocular movements intact.      Pupils: Pupils are equal, round, and reactive to light.   Musculoskeletal:         General: Normal range of motion.   Skin:     General: Skin is warm.   Neurological:      General: No focal deficit present.      Mental Status: He is alert.   Psychiatric:         Mood and Affect: Mood normal.         Thought Content: Thought content normal.               Assessment and Plan  Diagnoses and all orders for this visit:    In approximately 15 minutes face-to-face via [employer] video with patient  Diagnoses and all orders for this visit:    1. Injury of Achilles tendon, unspecified laterality, initial  encounter (Primary)    Patient to continue anti-inflammatory as tolerated.  Follow-up with orthopedics on 2/14/2022

## 2022-04-21 DIAGNOSIS — N52.9 ERECTILE DYSFUNCTION, UNSPECIFIED ERECTILE DYSFUNCTION TYPE: ICD-10-CM

## 2022-04-21 RX ORDER — VARDENAFIL HYDROCHLORIDE 10 MG/1
10 TABLET ORAL DAILY PRN
Qty: 5 TABLET | Refills: 5 | Status: SHIPPED | OUTPATIENT
Start: 2022-04-21 | End: 2022-12-02

## 2022-11-20 DIAGNOSIS — N52.9 ERECTILE DYSFUNCTION, UNSPECIFIED ERECTILE DYSFUNCTION TYPE: ICD-10-CM

## 2022-11-21 RX ORDER — VARDENAFIL HYDROCHLORIDE 10 MG/1
TABLET ORAL
Qty: 5 TABLET | Refills: 5 | OUTPATIENT
Start: 2022-11-21

## 2022-11-21 NOTE — TELEPHONE ENCOUNTER
LOV for chronic condition 10/5/2021  NOV     Left message for Kirti to return call     HUBB schedule Needs a appointment for Physical first available   Fasting   He has not had a PE since 12/3/2020  Will not be able to fill rx until scheduled   Also need  keep the appointment a

## 2022-12-02 DIAGNOSIS — N52.9 ERECTILE DYSFUNCTION, UNSPECIFIED ERECTILE DYSFUNCTION TYPE: ICD-10-CM

## 2022-12-02 RX ORDER — VARDENAFIL HYDROCHLORIDE 10 MG/1
TABLET ORAL
Qty: 5 TABLET | Refills: 5 | Status: SHIPPED | OUTPATIENT
Start: 2022-12-02

## 2023-05-26 DIAGNOSIS — E03.9 HYPOTHYROIDISM, UNSPECIFIED TYPE: ICD-10-CM

## 2023-05-30 ENCOUNTER — TELEPHONE (OUTPATIENT)
Dept: INTERNAL MEDICINE | Facility: CLINIC | Age: 43
End: 2023-05-30

## 2023-05-30 DIAGNOSIS — Z13.220 LIPID SCREENING: ICD-10-CM

## 2023-05-30 DIAGNOSIS — Z12.5 SCREENING PSA (PROSTATE SPECIFIC ANTIGEN): ICD-10-CM

## 2023-05-30 DIAGNOSIS — E03.9 HYPOTHYROIDISM, UNSPECIFIED TYPE: Primary | ICD-10-CM

## 2023-05-30 RX ORDER — LEVOTHYROXINE SODIUM 0.2 MG/1
TABLET ORAL
Qty: 30 TABLET | Refills: 1 | Status: SHIPPED | OUTPATIENT
Start: 2023-05-30

## 2023-05-30 NOTE — TELEPHONE ENCOUNTER
Patient has been notified of providers message and will come in for labs this week or next. Can you place lab orders please.

## 2023-05-30 NOTE — TELEPHONE ENCOUNTER
----- Message from Davidson Rodriguez MD sent at 5/30/2023  1:42 PM EDT -----  Regarding: Thyroid medication  Courtesy refill has been done patient needs to come in to have labs.  Has not had labs in 2 years

## 2023-06-13 ENCOUNTER — LAB (OUTPATIENT)
Dept: INTERNAL MEDICINE | Facility: CLINIC | Age: 43
End: 2023-06-13
Payer: COMMERCIAL

## 2023-06-13 DIAGNOSIS — Z13.220 LIPID SCREENING: ICD-10-CM

## 2023-06-13 DIAGNOSIS — E03.9 HYPOTHYROIDISM, UNSPECIFIED TYPE: ICD-10-CM

## 2023-06-13 DIAGNOSIS — Z12.5 SCREENING PSA (PROSTATE SPECIFIC ANTIGEN): ICD-10-CM

## 2023-06-13 LAB
ALBUMIN SERPL-MCNC: 4 G/DL (ref 3.5–5.2)
ALBUMIN/GLOB SERPL: 1.5 G/DL
ALP SERPL-CCNC: 70 U/L (ref 39–117)
ALT SERPL W P-5'-P-CCNC: 15 U/L (ref 1–41)
ANION GAP SERPL CALCULATED.3IONS-SCNC: 11.7 MMOL/L (ref 5–15)
AST SERPL-CCNC: 27 U/L (ref 1–40)
BILIRUB SERPL-MCNC: 0.3 MG/DL (ref 0–1.2)
BUN SERPL-MCNC: 19 MG/DL (ref 6–20)
BUN/CREAT SERPL: 15.7 (ref 7–25)
CALCIUM SPEC-SCNC: 9.1 MG/DL (ref 8.6–10.5)
CHLORIDE SERPL-SCNC: 105 MMOL/L (ref 98–107)
CHOLEST SERPL-MCNC: 128 MG/DL (ref 0–200)
CO2 SERPL-SCNC: 24.3 MMOL/L (ref 22–29)
CREAT SERPL-MCNC: 1.21 MG/DL (ref 0.76–1.27)
DEPRECATED RDW RBC AUTO: 41.9 FL (ref 37–54)
EGFRCR SERPLBLD CKD-EPI 2021: 76.2 ML/MIN/1.73
ERYTHROCYTE [DISTWIDTH] IN BLOOD BY AUTOMATED COUNT: 12.8 % (ref 12.3–15.4)
GLOBULIN UR ELPH-MCNC: 2.6 GM/DL
GLUCOSE SERPL-MCNC: 99 MG/DL (ref 65–99)
HCT VFR BLD AUTO: 44 % (ref 37.5–51)
HDLC SERPL-MCNC: 39 MG/DL (ref 40–60)
HGB BLD-MCNC: 14.9 G/DL (ref 13–17.7)
LDLC SERPL CALC-MCNC: 68 MG/DL (ref 0–100)
LDLC/HDLC SERPL: 1.71 {RATIO}
MCH RBC QN AUTO: 30 PG (ref 26.6–33)
MCHC RBC AUTO-ENTMCNC: 33.9 G/DL (ref 31.5–35.7)
MCV RBC AUTO: 88.7 FL (ref 79–97)
PLATELET # BLD AUTO: 257 10*3/MM3 (ref 140–450)
PMV BLD AUTO: 9.1 FL (ref 6–12)
POTASSIUM SERPL-SCNC: 4.2 MMOL/L (ref 3.5–5.2)
PROT SERPL-MCNC: 6.6 G/DL (ref 6–8.5)
PSA SERPL-MCNC: 0.62 NG/ML (ref 0–4)
RBC # BLD AUTO: 4.96 10*6/MM3 (ref 4.14–5.8)
SODIUM SERPL-SCNC: 141 MMOL/L (ref 136–145)
T4 FREE SERPL-MCNC: 1.63 NG/DL (ref 0.93–1.7)
TRIGL SERPL-MCNC: 112 MG/DL (ref 0–150)
TSH SERPL DL<=0.05 MIU/L-ACNC: 2.01 UIU/ML (ref 0.27–4.2)
VLDLC SERPL-MCNC: 21 MG/DL (ref 5–40)
WBC NRBC COR # BLD: 3.85 10*3/MM3 (ref 3.4–10.8)

## 2023-06-13 PROCEDURE — 80050 GENERAL HEALTH PANEL: CPT | Performed by: INTERNAL MEDICINE

## 2023-06-13 PROCEDURE — 80061 LIPID PANEL: CPT | Performed by: INTERNAL MEDICINE

## 2023-06-13 PROCEDURE — 84439 ASSAY OF FREE THYROXINE: CPT | Performed by: INTERNAL MEDICINE

## 2023-06-13 PROCEDURE — G0103 PSA SCREENING: HCPCS | Performed by: INTERNAL MEDICINE

## 2023-08-10 DIAGNOSIS — E03.9 HYPOTHYROIDISM, UNSPECIFIED TYPE: ICD-10-CM

## 2023-08-11 RX ORDER — LEVOTHYROXINE SODIUM 0.2 MG/1
TABLET ORAL
Qty: 90 TABLET | Refills: 2 | Status: SHIPPED | OUTPATIENT
Start: 2023-08-11

## 2024-01-23 DIAGNOSIS — N52.9 ERECTILE DYSFUNCTION, UNSPECIFIED ERECTILE DYSFUNCTION TYPE: ICD-10-CM

## 2024-01-24 NOTE — TELEPHONE ENCOUNTER
LOV 02/03/2022  NOV     Tried to reach patient no answer left voicemail to return call    RELAY:  We recently received your message to refill your medication(s).  Our records indicate that you did not schedule a follow up appointment with your provider at your last visit on 02/03/2022. The medication that you are on requires a follow up appointment every 12 months. Please let us know what days/times work for you and we will be happy to set up an appointment.  You may also contact our office at [phone] to schedule your appointment.  If you are unable to keep your appointment, we will not be able to provide further refills.  Once you have scheduled your appointment, we will be happy to process your refill request.

## 2024-01-30 RX ORDER — VARDENAFIL HYDROCHLORIDE 10 MG/1
10 TABLET ORAL DAILY PRN
Qty: 5 TABLET | Refills: 5 | Status: SHIPPED | OUTPATIENT
Start: 2024-01-30

## 2024-03-13 ENCOUNTER — OFFICE VISIT (OUTPATIENT)
Dept: INTERNAL MEDICINE | Facility: CLINIC | Age: 44
End: 2024-03-13
Payer: COMMERCIAL

## 2024-03-13 VITALS
DIASTOLIC BLOOD PRESSURE: 80 MMHG | HEIGHT: 77 IN | RESPIRATION RATE: 16 BRPM | WEIGHT: 315 LBS | BODY MASS INDEX: 37.19 KG/M2 | HEART RATE: 64 BPM | SYSTOLIC BLOOD PRESSURE: 142 MMHG | TEMPERATURE: 97.8 F

## 2024-03-13 DIAGNOSIS — N52.9 ERECTILE DYSFUNCTION, UNSPECIFIED ERECTILE DYSFUNCTION TYPE: ICD-10-CM

## 2024-03-13 DIAGNOSIS — Z12.5 SCREENING PSA (PROSTATE SPECIFIC ANTIGEN): ICD-10-CM

## 2024-03-13 DIAGNOSIS — E03.9 HYPOTHYROIDISM, UNSPECIFIED TYPE: ICD-10-CM

## 2024-03-13 DIAGNOSIS — Z00.00 WELL ADULT EXAM: ICD-10-CM

## 2024-03-13 DIAGNOSIS — Z13.220 LIPID SCREENING: Primary | ICD-10-CM

## 2024-03-13 LAB
ALBUMIN SERPL-MCNC: 4.3 G/DL (ref 3.5–5.2)
ALBUMIN/GLOB SERPL: 1.8 G/DL
ALP SERPL-CCNC: 78 U/L (ref 39–117)
ALT SERPL W P-5'-P-CCNC: 18 U/L (ref 1–41)
ANION GAP SERPL CALCULATED.3IONS-SCNC: 8 MMOL/L (ref 5–15)
AST SERPL-CCNC: 21 U/L (ref 1–40)
BILIRUB SERPL-MCNC: 0.9 MG/DL (ref 0–1.2)
BUN SERPL-MCNC: 14 MG/DL (ref 6–20)
BUN/CREAT SERPL: 11.8 (ref 7–25)
CALCIUM SPEC-SCNC: 9.4 MG/DL (ref 8.6–10.5)
CHLORIDE SERPL-SCNC: 105 MMOL/L (ref 98–107)
CHOLEST SERPL-MCNC: 153 MG/DL (ref 0–200)
CO2 SERPL-SCNC: 27 MMOL/L (ref 22–29)
CREAT SERPL-MCNC: 1.19 MG/DL (ref 0.76–1.27)
DEPRECATED RDW RBC AUTO: 44.6 FL (ref 37–54)
EGFRCR SERPLBLD CKD-EPI 2021: 77.2 ML/MIN/1.73
ERYTHROCYTE [DISTWIDTH] IN BLOOD BY AUTOMATED COUNT: 13.2 % (ref 12.3–15.4)
GLOBULIN UR ELPH-MCNC: 2.4 GM/DL
GLUCOSE SERPL-MCNC: 97 MG/DL (ref 65–99)
HCT VFR BLD AUTO: 46.6 % (ref 37.5–51)
HDLC SERPL-MCNC: 46 MG/DL (ref 40–60)
HGB BLD-MCNC: 15.6 G/DL (ref 13–17.7)
LDLC SERPL CALC-MCNC: 88 MG/DL (ref 0–100)
LDLC/HDLC SERPL: 1.87 {RATIO}
MCH RBC QN AUTO: 30.3 PG (ref 26.6–33)
MCHC RBC AUTO-ENTMCNC: 33.5 G/DL (ref 31.5–35.7)
MCV RBC AUTO: 90.5 FL (ref 79–97)
PLATELET # BLD AUTO: 269 10*3/MM3 (ref 140–450)
PMV BLD AUTO: 9.4 FL (ref 6–12)
POTASSIUM SERPL-SCNC: 4.6 MMOL/L (ref 3.5–5.2)
PROT SERPL-MCNC: 6.7 G/DL (ref 6–8.5)
PSA SERPL-MCNC: 0.62 NG/ML (ref 0–4)
RBC # BLD AUTO: 5.15 10*6/MM3 (ref 4.14–5.8)
SODIUM SERPL-SCNC: 140 MMOL/L (ref 136–145)
T4 FREE SERPL-MCNC: 1.32 NG/DL (ref 0.93–1.7)
TRIGL SERPL-MCNC: 105 MG/DL (ref 0–150)
TSH SERPL DL<=0.05 MIU/L-ACNC: 1.16 UIU/ML (ref 0.27–4.2)
VLDLC SERPL-MCNC: 19 MG/DL (ref 5–40)
WBC NRBC COR # BLD AUTO: 4.07 10*3/MM3 (ref 3.4–10.8)

## 2024-03-13 PROCEDURE — 80050 GENERAL HEALTH PANEL: CPT | Performed by: INTERNAL MEDICINE

## 2024-03-13 PROCEDURE — G0103 PSA SCREENING: HCPCS | Performed by: INTERNAL MEDICINE

## 2024-03-13 PROCEDURE — 80061 LIPID PANEL: CPT | Performed by: INTERNAL MEDICINE

## 2024-03-13 PROCEDURE — 84439 ASSAY OF FREE THYROXINE: CPT | Performed by: INTERNAL MEDICINE

## 2024-03-13 RX ORDER — VARDENAFIL HYDROCHLORIDE 10 MG/1
10 TABLET ORAL DAILY PRN
Qty: 10 TABLET | Refills: 6 | Status: SHIPPED | OUTPATIENT
Start: 2024-03-13

## 2024-03-13 NOTE — PROGRESS NOTES
"Chief Complaint  Annual Exam    Subjective    Guido Tang is a 43 y.o. male.     Guido Tang presents to [facility] for       History of Present Illness    1. Annual physical examination - He is attempting to lose weight. He has been working out 5 days a week and fasts for 12 to 8 hours. He is inquiring about Wegovy.    The following portions of the patient's history were reviewed and updated as appropriate: allergies, current medications, past family history, past medical history, past social history, past surgical history, and problem list.        Complete Adult Physical    Patient presents concerns in regards to dieting, losing weight,        Diet regular        Exercise: Patient has started back into the gym exercising twice a week        Social History         Preventative Screenings        Immunizations:           Review of Systems    Objective   Vital Signs:   /80 (BP Location: Right arm, Patient Position: Sitting, Cuff Size: Large Adult)   Pulse 64   Temp 97.8 °F (36.6 °C) (Temporal)   Resp 16   Ht 196 cm (77.17\")   Wt (!) 154 kg (340 lb 8 oz)   BMI 40.20 kg/m²     Body mass index is 40.2 kg/m².  Class 3 Severe Obesity (BMI >=40). Obesity-related health conditions include the following: none. Obesity is improving with lifestyle modifications. BMI is is above average; BMI management plan is completed. We discussed portion control and increasing exercise.     Physical Exam  HENT:      Head: Normocephalic and atraumatic.      Mouth/Throat:      Mouth: Mucous membranes are moist.   Eyes:      Extraocular Movements: Extraocular movements intact.      Pupils: Pupils are equal, round, and reactive to light.   Neck:      Comments: No goiter.  Cardiovascular:      Rate and Rhythm: Normal rate and regular rhythm.      Pulses:           Carotid pulses are 2+ on the right side and 2+ on the left side.       Radial pulses are 2+ on the right side and " 2+ on the left side.        Femoral pulses are 2+ on the right side and 2+ on the left side.       Popliteal pulses are 2+ on the right side and 2+ on the left side.        Dorsalis pedis pulses are 2+ on the right side and 2+ on the left side.        Posterior tibial pulses are 2+ on the right side and 2+ on the left side.      Heart sounds: S1 normal and S2 normal. No murmur heard.     No friction rub. No gallop.      Comments: Good perfusion.  Pulmonary:      Effort: Pulmonary effort is normal.      Breath sounds: Normal breath sounds. No wheezing or rhonchi.   Abdominal:      General: Bowel sounds are normal.      Palpations: Abdomen is soft. There is no mass.      Tenderness: There is no abdominal tenderness.   Genitourinary:     Testes: Normal.      Comments: No inguinal hernia.  Musculoskeletal:      Cervical back: Neck supple.      Comments: +5 out of 5 both upper and lower distribution.   Lymphadenopathy:      Cervical: No cervical adenopathy.   Skin:     General: Skin is warm.   Neurological:      Mental Status: He is alert and oriented to person, place, and time.      Cranial Nerves: Cranial nerves 2-12 are intact.      Deep Tendon Reflexes: Reflexes are normal and symmetric.             Assessment and Plan  Diagnoses and all orders for this visit:    1. Hypothyroidism.  The patient is continued on levothyroxine.   No other active issues or side effects to report currently.    2. Erectile dysfunction.  The patient is continued on Levitra 10 mg as directed.  No other active issues currently.    3. Nutrition and weight loss.  I had a good discussion with the patient regarding his approach to weight loss and so he is going to do slightly more accountability with calorie tracking and activity also in considerations of Wegovy as well.   We also discussed that as well too.     4. Annual physical examination  Labs that we do today, CBC, CMP, TSH, free T4, lipid profile and PSA for prostate cancer  screening.  Otherwise, everything else looks good.     Diagnoses and all orders for this visit:    1. Lipid screening (Primary)  -     Lipid Panel; Future  -     Lipid Panel    2. Hypothyroidism, unspecified type    3. Erectile dysfunction, unspecified erectile dysfunction type  -     vardenafil (LEVITRA) 10 MG tablet; Take 1 tablet by mouth Daily As Needed for Erectile Dysfunction.  Dispense: 10 tablet; Refill: 6    4. Well adult exam  -     CBC (No Diff); Future  -     Comprehensive Metabolic Panel; Future  -     TSH; Future  -     T4, Free; Future  -     CBC (No Diff)  -     Comprehensive Metabolic Panel  -     TSH  -     T4, Free    5. Screening PSA (prostate specific antigen)  -     PSA Screen; Future  -     PSA Screen            Follow Up   Return in about 1 year (around 3/13/2025) for Annual.  Patient was given instructions and counseling regarding his condition or for health maintenance advice. Please see specific information pulled into the AVS if appropriate.     Transcribed from ambient dictation for Davidson Rodriguez MD by Nicolas Arizmendi.  03/13/24   12:42 EDT    Patient or patient representative verbalized consent to the visit recording.  I have personally performed the services described in this document as transcribed by the above individual, and it is both accurate and complete.

## 2024-08-22 DIAGNOSIS — E03.9 HYPOTHYROIDISM, UNSPECIFIED TYPE: ICD-10-CM

## 2024-08-23 RX ORDER — LEVOTHYROXINE SODIUM 0.2 MG/1
TABLET ORAL
Qty: 90 TABLET | Refills: 2 | Status: SHIPPED | OUTPATIENT
Start: 2024-08-23

## 2024-12-11 ENCOUNTER — TELEPHONE (OUTPATIENT)
Dept: INTERNAL MEDICINE | Facility: CLINIC | Age: 44
End: 2024-12-11

## 2024-12-11 ENCOUNTER — HOSPITAL ENCOUNTER (OUTPATIENT)
Dept: CT IMAGING | Facility: HOSPITAL | Age: 44
Discharge: HOME | End: 2024-12-11
Admitting: INTERNAL MEDICINE
Payer: COMMERCIAL

## 2024-12-11 ENCOUNTER — OFFICE VISIT (OUTPATIENT)
Dept: INTERNAL MEDICINE | Facility: CLINIC | Age: 44
End: 2024-12-11
Payer: COMMERCIAL

## 2024-12-11 VITALS
HEART RATE: 64 BPM | RESPIRATION RATE: 18 BRPM | TEMPERATURE: 97.3 F | SYSTOLIC BLOOD PRESSURE: 120 MMHG | WEIGHT: 315 LBS | DIASTOLIC BLOOD PRESSURE: 84 MMHG | BODY MASS INDEX: 40.69 KG/M2

## 2024-12-11 DIAGNOSIS — G44.52 NEW DAILY PERSISTENT HEADACHE: Primary | ICD-10-CM

## 2024-12-11 DIAGNOSIS — G44.52 NEW DAILY PERSISTENT HEADACHE: ICD-10-CM

## 2024-12-11 LAB
BASOPHILS # BLD AUTO: 0.03 10*3/MM3 (ref 0–0.2)
BASOPHILS NFR BLD AUTO: 0.7 % (ref 0–1.5)
CRP SERPL-MCNC: <0.3 MG/DL (ref 0–0.5)
DEPRECATED RDW RBC AUTO: 43.4 FL (ref 37–54)
EOSINOPHIL # BLD AUTO: 0.11 10*3/MM3 (ref 0–0.4)
EOSINOPHIL NFR BLD AUTO: 2.6 % (ref 0.3–6.2)
ERYTHROCYTE [DISTWIDTH] IN BLOOD BY AUTOMATED COUNT: 13 % (ref 12.3–15.4)
HCT VFR BLD AUTO: 45.5 % (ref 37.5–51)
HGB BLD-MCNC: 15.5 G/DL (ref 13–17.7)
IMM GRANULOCYTES # BLD AUTO: 0.01 10*3/MM3 (ref 0–0.05)
IMM GRANULOCYTES NFR BLD AUTO: 0.2 % (ref 0–0.5)
LYMPHOCYTES # BLD AUTO: 1.5 10*3/MM3 (ref 0.7–3.1)
LYMPHOCYTES NFR BLD AUTO: 35.9 % (ref 19.6–45.3)
MCH RBC QN AUTO: 30.9 PG (ref 26.6–33)
MCHC RBC AUTO-ENTMCNC: 34.1 G/DL (ref 31.5–35.7)
MCV RBC AUTO: 90.8 FL (ref 79–97)
MONOCYTES # BLD AUTO: 0.42 10*3/MM3 (ref 0.1–0.9)
MONOCYTES NFR BLD AUTO: 10 % (ref 5–12)
NEUTROPHILS NFR BLD AUTO: 2.11 10*3/MM3 (ref 1.7–7)
NEUTROPHILS NFR BLD AUTO: 50.6 % (ref 42.7–76)
NRBC BLD AUTO-RTO: 0 /100 WBC (ref 0–0.2)
PLATELET # BLD AUTO: 268 10*3/MM3 (ref 140–450)
PMV BLD AUTO: 9.2 FL (ref 6–12)
RBC # BLD AUTO: 5.01 10*6/MM3 (ref 4.14–5.8)
WBC NRBC COR # BLD AUTO: 4.18 10*3/MM3 (ref 3.4–10.8)

## 2024-12-11 PROCEDURE — 70450 CT HEAD/BRAIN W/O DYE: CPT

## 2024-12-11 PROCEDURE — 86140 C-REACTIVE PROTEIN: CPT | Performed by: INTERNAL MEDICINE

## 2024-12-11 PROCEDURE — 85652 RBC SED RATE AUTOMATED: CPT | Performed by: INTERNAL MEDICINE

## 2024-12-11 PROCEDURE — 85025 COMPLETE CBC W/AUTO DIFF WBC: CPT | Performed by: INTERNAL MEDICINE

## 2024-12-11 NOTE — TELEPHONE ENCOUNTER
Please notify the patient the CT of his head was negative.  I would recommend Tylenol and Ibuprofen as needed for the head pain while we are waiting for his labs to come back.

## 2024-12-11 NOTE — PROGRESS NOTES
Subjective       Guido Tang is a 44 y.o. male.     Chief Complaint   Patient presents with    Headache     Feel like migraines   On left side has a lot of pressure   X 2 days        History obtained from the patient.      History of Present Illness     The patient presents with a sudden onset constant left-sided headache and pain behind the left eye for the 2 days.  He states the pain got acutely worse last night.  He has no history of migraine headache.  He denies injury or trauma.  He reports the pain is 8/10 in intensity, but does get slightly better and slightly worse from there.  There is no change in the pain with position, coughing/sneezing, Valsalva, or sexual activity.  He has tried taking an over-the-counter cold medicine with Tylenol without relief.    He states the left side of his head is tender to touch.  He is mildly lightheaded, but no actual dizziness.  He denies blurred or double vision, weakness, numbness, and tingling.  There is no sore throat, earache, fever, chills, congestion, rhinorrhea, or postnasal drainage.  He denies nausea and vomiting.    The following portions of the patient's history were reviewed and updated as appropriate: allergies, current medications, past family history, past medical history, past social history, past surgical history, and problem list.      Review of Systems   Constitutional:  Negative for chills and fever.   HENT:  Negative for congestion, ear pain, postnasal drip, rhinorrhea and sore throat.    Eyes:  Positive for pain. Negative for visual disturbance.   Respiratory:  Negative for cough, shortness of breath and wheezing.    Cardiovascular:  Negative for chest pain.   Gastrointestinal:  Negative for abdominal pain, constipation, diarrhea, nausea and vomiting.   Neurological:  Positive for light-headedness and headaches. Negative for dizziness, speech difficulty, weakness and numbness.           Objective     Blood pressure 120/84, pulse 64, temperature  97.3 °F (36.3 °C), temperature source Temporal, resp. rate 18, weight (!) 156 kg (344 lb 9.6 oz).    Physical Exam  Vitals and nursing note reviewed.   Constitutional:       Appearance: He is well-developed.      Comments: BMI greater than 35   HENT:      Right Ear: Tympanic membrane, ear canal and external ear normal.      Left Ear: Tympanic membrane, ear canal and external ear normal.      Mouth/Throat:      Mouth: Mucous membranes are moist.      Pharynx: Oropharynx is clear.   Eyes:      Extraocular Movements: Extraocular movements intact.      Pupils: Pupils are equal, round, and reactive to light.   Neck:      Vascular: No carotid bruit.   Cardiovascular:      Rate and Rhythm: Normal rate and regular rhythm.      Heart sounds: Normal heart sounds. No murmur heard.  Pulmonary:      Effort: Pulmonary effort is normal.      Breath sounds: Normal breath sounds.   Musculoskeletal:         General: Normal range of motion.      Cervical back: Normal range of motion and neck supple.   Skin:     Findings: No rash.   Neurological:      Mental Status: He is alert and oriented to person, place, and time.      Motor: Motor function is intact.      Coordination: Romberg sign negative. Finger-Nose-Finger Test and Heel to Shin Test normal. Rapid alternating movements normal.      Gait: Gait normal.      Deep Tendon Reflexes: Reflexes are normal and symmetric.      Comments: The patient is able to heel, toe, and tandem walk appropriately.   Psychiatric:         Mood and Affect: Mood normal.           Assessment & Plan   Diagnoses and all orders for this visit:    1. New daily persistent headache (Primary)- rule out intracranial hemorrhage  -     CT Head Without Contrast; Future  -     CBC & Differential  -     C-reactive Protein  -     Sedimentation Rate  Continue Tylenol      Return if symptoms worsen or fail to improve.

## 2024-12-12 ENCOUNTER — TELEPHONE (OUTPATIENT)
Dept: INTERNAL MEDICINE | Facility: CLINIC | Age: 44
End: 2024-12-12
Payer: COMMERCIAL

## 2024-12-12 LAB — ERYTHROCYTE [SEDIMENTATION RATE] IN BLOOD: 9 MM/HR (ref 0–15)

## 2024-12-12 RX ORDER — CELECOXIB 200 MG/1
200 CAPSULE ORAL DAILY
Qty: 30 CAPSULE | Refills: 1 | Status: SHIPPED | OUTPATIENT
Start: 2024-12-12

## 2024-12-12 NOTE — TELEPHONE ENCOUNTER
Called and spoke with patient relayed information per provider request: Prescription for Celebrex sent to the pharmacy.  This is a once daily dosing.  He should not take any Ibuprofen or Aleve while on this medication, but he may take Tylenol.    Patient verbalized understanding. Phone call complete.

## 2024-12-12 NOTE — TELEPHONE ENCOUNTER
Patient has been notified of results and recommendations. Patient verb good understanding and wanted to see if you could call in something a little stronger for his headache he has been using tylenol and ibuprofen and they are not touching the headache he is having.

## 2024-12-12 NOTE — TELEPHONE ENCOUNTER
Prescription for Celebrex sent to the pharmacy.  This is a once daily dosing.  He should not take any Ibuprofen or Aleve while on this medication, but he may take Tylenol.

## 2024-12-12 NOTE — TELEPHONE ENCOUNTER
Caller: ClydeValerie    Relationship: Emergency Contact    Best call back number: [phone]     What is the best time to reach you: ANYTIME    Who are you requesting to speak with (clinical staff, provider,  specific staff member): DR. MCDANIEL    Do you know the name of the person who called: PATIENT'S WIFE VALERIE    What was the call regarding: PATIENT 'S WIFE IS CALLING TO GET A CALL BACK TO DISCUSS THE RESULTS OF THE CT TAKEN 12/11/24.    PLEASE CALL WIFE TO DISCUSS THE RESULTS ASAP.     Is it okay if the provider responds through MyChart: NO PLEASE CALL

## 2024-12-17 ENCOUNTER — TELEPHONE (OUTPATIENT)
Dept: INTERNAL MEDICINE | Facility: CLINIC | Age: 44
End: 2024-12-17
Payer: COMMERCIAL

## 2024-12-17 NOTE — TELEPHONE ENCOUNTER
If the patient is having worsening symptoms related to shingles, he should be seen in clinic.  Thanks.

## 2024-12-17 NOTE — TELEPHONE ENCOUNTER
Per Dr. Miguel, patient needs to be seen in clinic. Spoke with patient and he stated that he will call back to schedule.

## 2024-12-17 NOTE — TELEPHONE ENCOUNTER
Caller: Calvin Tang    Relationship: Emergency Contact    Best call back number: [phone]     What medication are you requesting: SOMETHING FOR SYMPTOMS    What are your current symptoms: PAIN FROM SHINGLES    How long have you been experiencing symptoms: A FEW DAYS      If a prescription is needed, what is your preferred pharmacy and phone number: [facility] [id number] - [place], [place] - [facility] [address] - [phone]  - [phone]     Additional notes:PATIENTS SPOUSE STATES THAT THE GABAPENTIN IS NOT WORKING AND WANTS TO KNOW IF THERE IS ANYTHING TOPICAL THEY CAN USE FOR BLISTERS

## 2024-12-27 ENCOUNTER — TELEPHONE (OUTPATIENT)
Dept: INTERNAL MEDICINE | Facility: CLINIC | Age: 44
End: 2024-12-27
Payer: COMMERCIAL

## 2024-12-27 DIAGNOSIS — B00.9 HERPETIC LESIONS OF FACE: Primary | ICD-10-CM

## 2024-12-27 NOTE — TELEPHONE ENCOUNTER
Caller: Guido Tang    Relationship: Self    Best call back number:  [phone] (Mobile)     What is the medical concern/diagnosis: BLURRY VISION     What specialty or service is being requested: EYE SPECIALIST     Any additional details:  PATIENT WENT TO URGENT CARE ON 12-14-24  AND HE WAS DIAGNOSED WITH SHINGLES   THEY TOLD HIM IF HIS EYES WERE BLURRY HE WOULD NEED TO SEE A EYE SPECIALIST     PLEASE CALL        no

## 2024-12-27 NOTE — TELEPHONE ENCOUNTER
Tried to reach patient no answer left voicemail to return call    RELAY:  Does patient have a shingles outbreak in the eye or around the eye?  If so he needs to go to the emergency room for immediate consultation with ophthalmologist on-call     If not we can put in a referral but I just want to make sure that is not an emergency.

## 2024-12-27 NOTE — TELEPHONE ENCOUNTER
Does patient have a shingles outbreak in the eye or around the eye?  If so he needs to go to the emergency room for immediate consultation with ophthalmologist on-call    If not we can put in a referral but I just want to make sure that is not an emergency.

## 2024-12-30 NOTE — TELEPHONE ENCOUNTER
Patient stated that he went to [facility] and they told him that if he starts to have blurry vision to go to the specialist. Patient stated that the rash is not  in his eye but is around it. Patient would just like to have it looked at

## 2024-12-31 NOTE — TELEPHONE ENCOUNTER
Left voice message for patient to return call.      Relay: Please tell patient that ophthalmology referral has been placed and someone will get in contact with him with appointment date and time.  In the meantime, if his symptoms progressively get worse he should go to the emergency room.

## 2025-01-06 NOTE — TELEPHONE ENCOUNTER
2nd attempt     Relay: Please tell patient that ophthalmology referral has been placed and someone will get in contact with him with appointment date and time.  In the meantime, if his symptoms progressively get worse he should go to the emergency room.

## 2025-03-17 ENCOUNTER — OFFICE VISIT (OUTPATIENT)
Dept: INTERNAL MEDICINE | Facility: CLINIC | Age: 45
End: 2025-03-17
Payer: COMMERCIAL

## 2025-03-17 VITALS
WEIGHT: 315 LBS | BODY MASS INDEX: 37.19 KG/M2 | HEART RATE: 52 BPM | TEMPERATURE: 98.2 F | RESPIRATION RATE: 16 BRPM | SYSTOLIC BLOOD PRESSURE: 124 MMHG | DIASTOLIC BLOOD PRESSURE: 90 MMHG | HEIGHT: 77 IN

## 2025-03-17 DIAGNOSIS — Z12.5 SCREENING PSA (PROSTATE SPECIFIC ANTIGEN): ICD-10-CM

## 2025-03-17 DIAGNOSIS — Z11.59 NEED FOR HEPATITIS C SCREENING TEST: ICD-10-CM

## 2025-03-17 DIAGNOSIS — Z12.11 ENCOUNTER FOR SCREENING FOR MALIGNANT NEOPLASM OF COLON: ICD-10-CM

## 2025-03-17 DIAGNOSIS — Z00.00 WELL ADULT EXAM: Primary | ICD-10-CM

## 2025-03-17 DIAGNOSIS — Z13.220 LIPID SCREENING: ICD-10-CM

## 2025-03-17 DIAGNOSIS — E03.9 HYPOTHYROIDISM, UNSPECIFIED TYPE: ICD-10-CM

## 2025-03-17 DIAGNOSIS — G47.33 OSA (OBSTRUCTIVE SLEEP APNEA): ICD-10-CM

## 2025-03-17 LAB
ALBUMIN SERPL-MCNC: 4.1 G/DL (ref 3.5–5.2)
ALBUMIN/GLOB SERPL: 1.5 G/DL
ALP SERPL-CCNC: 58 U/L (ref 39–117)
ALT SERPL W P-5'-P-CCNC: 16 U/L (ref 1–41)
ANION GAP SERPL CALCULATED.3IONS-SCNC: 9.6 MMOL/L (ref 5–15)
AST SERPL-CCNC: 22 U/L (ref 1–40)
BILIRUB SERPL-MCNC: 0.9 MG/DL (ref 0–1.2)
BUN SERPL-MCNC: 16 MG/DL (ref 6–20)
BUN/CREAT SERPL: 12.8 (ref 7–25)
CALCIUM SPEC-SCNC: 9 MG/DL (ref 8.6–10.5)
CHLORIDE SERPL-SCNC: 104 MMOL/L (ref 98–107)
CHOLEST SERPL-MCNC: 139 MG/DL (ref 0–200)
CO2 SERPL-SCNC: 26.4 MMOL/L (ref 22–29)
CREAT SERPL-MCNC: 1.25 MG/DL (ref 0.76–1.27)
DEPRECATED RDW RBC AUTO: 46.3 FL (ref 37–54)
EGFRCR SERPLBLD CKD-EPI 2021: 72.4 ML/MIN/1.73
ERYTHROCYTE [DISTWIDTH] IN BLOOD BY AUTOMATED COUNT: 13.7 % (ref 12.3–15.4)
GLOBULIN UR ELPH-MCNC: 2.7 GM/DL
GLUCOSE SERPL-MCNC: 96 MG/DL (ref 65–99)
HCT VFR BLD AUTO: 46.4 % (ref 37.5–51)
HCV AB SER QL: NORMAL
HDLC SERPL-MCNC: 40 MG/DL (ref 40–60)
HGB BLD-MCNC: 15 G/DL (ref 13–17.7)
LDLC SERPL CALC-MCNC: 73 MG/DL (ref 0–100)
LDLC/HDLC SERPL: 1.73 {RATIO}
MCH RBC QN AUTO: 29.5 PG (ref 26.6–33)
MCHC RBC AUTO-ENTMCNC: 32.3 G/DL (ref 31.5–35.7)
MCV RBC AUTO: 91.3 FL (ref 79–97)
PLATELET # BLD AUTO: 253 10*3/MM3 (ref 140–450)
PMV BLD AUTO: 9.4 FL (ref 6–12)
POTASSIUM SERPL-SCNC: 4.2 MMOL/L (ref 3.5–5.2)
PROT SERPL-MCNC: 6.8 G/DL (ref 6–8.5)
PSA SERPL-MCNC: 0.62 NG/ML (ref 0–4)
RBC # BLD AUTO: 5.08 10*6/MM3 (ref 4.14–5.8)
SODIUM SERPL-SCNC: 140 MMOL/L (ref 136–145)
T4 FREE SERPL-MCNC: 1.1 NG/DL (ref 0.92–1.68)
TRIGL SERPL-MCNC: 149 MG/DL (ref 0–150)
TSH SERPL DL<=0.05 MIU/L-ACNC: 4.17 UIU/ML (ref 0.27–4.2)
VLDLC SERPL-MCNC: 26 MG/DL (ref 5–40)
WBC NRBC COR # BLD AUTO: 3.83 10*3/MM3 (ref 3.4–10.8)

## 2025-03-17 PROCEDURE — 80050 GENERAL HEALTH PANEL: CPT | Performed by: INTERNAL MEDICINE

## 2025-03-17 PROCEDURE — 99396 PREV VISIT EST AGE 40-64: CPT | Performed by: INTERNAL MEDICINE

## 2025-03-17 PROCEDURE — 86803 HEPATITIS C AB TEST: CPT | Performed by: INTERNAL MEDICINE

## 2025-03-17 PROCEDURE — 99213 OFFICE O/P EST LOW 20 MIN: CPT | Performed by: INTERNAL MEDICINE

## 2025-03-17 PROCEDURE — G0103 PSA SCREENING: HCPCS | Performed by: INTERNAL MEDICINE

## 2025-03-17 PROCEDURE — 84439 ASSAY OF FREE THYROXINE: CPT | Performed by: INTERNAL MEDICINE

## 2025-03-17 PROCEDURE — 80061 LIPID PANEL: CPT | Performed by: INTERNAL MEDICINE

## 2025-03-17 NOTE — PROGRESS NOTES
Chief Complaint  Annual Exam    Subjective    Guido Tang is a 44 y.o. male.     Guido Tang presents to [facility] for     History of Present Illness  The patient presents for a physical exam.    He has expressed concerns regarding his blood pressure, which he perceives to be elevated. Additionally, he reports experiencing mild aches upon awakening, but these symptoms resolve as he becomes more active throughout the day.    His wife has observed that he snores during sleep and has suggested a sleep study to investigate potential sleep apnea.    He is also seeking advice on weight management strategies, despite maintaining a regular exercise regimen of 4 days per week, each session lasting approximately 1.5 hours. He is trying to eat better.    He had shingles a few months ago.    MEDICATIONS  levothyroxine           Complete Adult Physical          Diet: regular         Exercise at least 4-5 x a week         Social History   No smoking  No etoh  No marijuana         Preventative Screenings  Needs to schedule initial colonoscopy         Immunizations: declined flu and COVID 19 vaccines          The following portions of the patient's history were reviewed and updated as appropriate: allergies, current medications, past family history, past medical history, past social history, past surgical history, and problem list.    Review of Systems   All other systems reviewed and are negative.    Objective   Body mass index is 39.73 kg/m².  Class 2 Severe Obesity (BMI >=35 and <=39.9). Obesity-related health conditions include the following: none. Obesity is newly identified. BMI is is above average; BMI management plan is completed. We discussed portion control and increasing exercise.       Vital Signs:   /90 (BP Location: Right arm, Patient Position: Sitting, Cuff Size: Large Adult)   Pulse 52   Temp 98.2 °F (36.8 °C) (Temporal)   Resp 16   Ht 195.6 cm  "(77\")   Wt (!) 152 kg (335 lb)   BMI 39.73 kg/m²       Physical Exam  The patient is alert x3, in no distress.  Head is normocephalic and atraumatic. Pupils are equal, light, and accommodation. Extraocular muscles are intact. Membranes are moist.  Neck is supple. No cervical lymphadenopathy or goiter.  Chest is clear to auscultation. No rhonchi or wheeze.  Heart sounds S1 and S2 are normal. No murmurs, rubs, or gallop.  No testicular mass or inguinal hernia noted.  Peripheral vascular system shows +2 pulses, warm extremities, and good perfusion.  Musculoskeletal system shows 5 out of 5 strength in both upper and lower proximal distribution.  Cranial nerves are intact. Deep tendon reflexes (DTRs) are +2.       Results              Assessment and Plan  Diagnoses and all orders for this visit:  Assessment & Plan  1. Sleep Apnea.  A sleep study will be ordered to evaluate for sleep apnea. If diagnosed, medications for weight loss may be covered under this diagnosis, potentially allowing him to come off CPAP.    2. Hypothyroidism.  This is chronic and controlled. He will continue on levothyroxine, which has been effective with no reported side effects.    3. Weight Loss.  His BMI is 39. He inquired about GLP-1 medications. If sleep apnea is diagnosed, it could be an additional diagnosis covered for these medications. This will be monitored.    4. Health Maintenance.  He is due for his initial colonoscopy, which has been arranged. Labs including CBC, CMP, TSH, free T4, lipid profile, and PSA for prostate cancer screening will be done today. He should also be up to date on routine ophthalmology and dental visits.    Follow-up  The patient will follow up in 1 year or earlier if indicated for his complete physical.     Diagnoses and all orders for this visit:    1. Well adult exam (Primary)  -     CBC (No Diff); Future  -     Comprehensive Metabolic Panel; Future  -     TSH; Future  -     T4, Free; Future    2. Encounter " for screening for malignant neoplasm of colon  -     Ambulatory Referral For Screening Colonoscopy    3. Hypothyroidism, unspecified type    4. Primary sleep apnea of , unspecified type    5. Screening PSA (prostate specific antigen)  -     PSA Screen; Future    6. Lipid screening  -     Lipid Panel; Future    7. Need for hepatitis C screening test  -     Hepatitis C antibody; Future                Follow Up   Return in about 1 year (around 3/17/2026) for Annual physical.  Patient was given instructions and counseling regarding his condition or for health maintenance advice. Please see specific information pulled into the AVS if appropriate.     Patient or patient representative verbalized consent for the use of Ambient Listening during the visit with  Davidson Rodriguez MD for chart documentation. 3/17/2025  08:28 EDT

## 2025-03-23 ENCOUNTER — RESULTS FOLLOW-UP (OUTPATIENT)
Dept: INTERNAL MEDICINE | Facility: CLINIC | Age: 45
End: 2025-03-23
Payer: COMMERCIAL

## 2025-05-03 DIAGNOSIS — N52.9 ERECTILE DYSFUNCTION, UNSPECIFIED ERECTILE DYSFUNCTION TYPE: ICD-10-CM

## 2025-05-05 RX ORDER — VARDENAFIL HYDROCHLORIDE 10 MG/1
10 TABLET ORAL DAILY PRN
Qty: 10 TABLET | Refills: 6 | Status: SHIPPED | OUTPATIENT
Start: 2025-05-05

## 2025-06-23 ENCOUNTER — OFFICE VISIT (OUTPATIENT)
Dept: SLEEP MEDICINE | Age: 45
End: 2025-06-23
Payer: COMMERCIAL

## 2025-06-23 VITALS
DIASTOLIC BLOOD PRESSURE: 86 MMHG | OXYGEN SATURATION: 96 % | HEIGHT: 77 IN | SYSTOLIC BLOOD PRESSURE: 120 MMHG | WEIGHT: 315 LBS | TEMPERATURE: 98.5 F | HEART RATE: 64 BPM | BODY MASS INDEX: 37.19 KG/M2

## 2025-06-23 DIAGNOSIS — G47.19 EXCESSIVE DAYTIME SLEEPINESS: ICD-10-CM

## 2025-06-23 DIAGNOSIS — R06.83 SNORING: ICD-10-CM

## 2025-06-23 DIAGNOSIS — E66.9 OBESITY (BMI 30-39.9): ICD-10-CM

## 2025-06-23 DIAGNOSIS — G47.33 OBSTRUCTIVE SLEEP APNEA, ADULT: Primary | ICD-10-CM

## 2025-06-23 PROCEDURE — 99204 OFFICE O/P NEW MOD 45 MIN: CPT | Performed by: INTERNAL MEDICINE

## 2025-06-23 NOTE — PROGRESS NOTES
"  Guido Tang is a 44 y.o. male.   Chief Complaint   Patient presents with    Snoring    Dry Mouth    Heartburn       HPI     44 y.o. male seen in consultation at the request of Davidson Rodriguez MD for evaluation of the above.     He has been told by his wife that he snores.  She describes this to be a nightly occurrence.  She has noted apneas.    He recalls awakening once or twice per night.  He keeps a regular sleep schedule from around 11 PM until 7 AM and a bit later on weekends.  He fall asleep fairly quickly.  He does not nap during the day.    He denies daytime somnolence but also states that he can sleep at any time.  His [place] Sleepiness Scale is elevated.    He has been losing weight with diet and exercise.    He denies any RLS type symptoms.  He does wake with a dry mouth but has no morning headaches or sore throat.      [place] Scale is: 16/24    The patient's relevant past medical, surgical, family, and social history reviewed and updated in Epic as appropriate.    Current medications are:   Current Outpatient Medications:     levothyroxine (SYNTHROID, LEVOTHROID) 200 MCG tablet, TAKE 1 TABLET BY MOUTH DAILY, Disp: 90 tablet, Rfl: 2    vardenafil (LEVITRA) 10 MG tablet, TAKE 1 TABLET BY MOUTH DAILY AS NEEDED FOR ERECTILE DYSFUNCTION, Disp: 10 tablet, Rfl: 6.    Review of Systems    Review of Systems  ROS documented in patient questionnaire ×14 systems.  Otherwise negative except as noted in HPI.    Physical Exam    Blood pressure 120/86, pulse 64, temperature 98.5 °F (36.9 °C), temperature source Infrared, height 195.6 cm (77\"), weight (!) 145 kg (320 lb), SpO2 96%. Body mass index is 37.95 kg/m².    Physical Exam  Vitals and nursing note reviewed.   Constitutional:       Appearance: Normal appearance. He is well-developed.   HENT:      Head: Normocephalic and atraumatic.      Nose: Nose normal.      Mouth/Throat:      Mouth: Mucous membranes are moist.      Pharynx: Oropharynx is clear. No " oropharyngeal exudate.      Comments: Class III airway  Decreased posterior pharyngeal space  Eyes:      General: No scleral icterus.     Conjunctiva/sclera: Conjunctivae normal.   Neck:      Thyroid: No thyromegaly.      Trachea: No tracheal deviation.   Cardiovascular:      Rate and Rhythm: Normal rate and regular rhythm.      Heart sounds: No murmur heard.     No friction rub. No gallop.   Pulmonary:      Effort: Pulmonary effort is normal. No respiratory distress.      Breath sounds: No wheezing or rales.   Musculoskeletal:         General: No deformity. Normal range of motion.   Skin:     General: Skin is warm and dry.      Findings: No rash.   Neurological:      Mental Status: He is alert and oriented to person, place, and time.   Psychiatric:         Behavior: Behavior normal.         Thought Content: Thought content normal.         DATA:    Reviewed 3/17/2025 note from Dr. Rodriguez    Reviewed bed partner questionnaire    ASSESSMENT:    Problem List Items Addressed This Visit    None  Visit Diagnoses         Obstructive sleep apnea, adult    -  Primary    Relevant Orders    Home Sleep Study      Snoring        Relevant Orders    Home Sleep Study      Excessive daytime sleepiness        Relevant Orders    Home Sleep Study      Obesity (BMI 30-39.9)                44-year-old male referred to the sleep center by Dr. Rodriguez.  He presents with snoring and witnessed apneas.  He does appear to have some daytime somnolence after taking a history though this is not a specific complaint of his.  He has an elevated BMI and decreased posterior pharyngeal space.    I think would be reasonable proceed with a diagnostic evaluation.  I discussed the possible of obstructive sleep apnea and the importance of diagnosis and treatment in regards to potentially improving current quality of life and avoiding long-term cardiovascular and metabolic complications.  He was agreeable to proceed.    PLAN:    - Home sleep apnea  testing.  - Diagnostic process and potential treatment options discussed and questions/concerns addressed.  - Long-term cardiovascular and metabolic risks of untreated obstructive sleep apnea reviewed with the patient.  - The importance of long-term healthy weight loss discussed.  - Patient was agreeable to a trial of CPAP therapy on an initial trial basis if recommended after testing complete.  - If his HSAT was convincingly normal I do not think I would proceed with a PSG due to his lack of cardiovascular or metabolic disease and perceived daytime somnolence.  - Sleep center follow-up.    I have reviewed the results of my evaluation and impression and discussed my recommendations in detail with the patient.    Signed by  Abdulkadir Machuca MD    June 23, 2025      CC: Davidson Rodriguez MD Gilliam, Jai, MD